# Patient Record
Sex: MALE | Race: WHITE | NOT HISPANIC OR LATINO | URBAN - METROPOLITAN AREA
[De-identification: names, ages, dates, MRNs, and addresses within clinical notes are randomized per-mention and may not be internally consistent; named-entity substitution may affect disease eponyms.]

---

## 2020-06-03 ENCOUNTER — EMERGENCY (EMERGENCY)
Facility: HOSPITAL | Age: 65
LOS: 0 days | Discharge: HOME | End: 2020-06-03
Attending: EMERGENCY MEDICINE | Admitting: EMERGENCY MEDICINE
Payer: COMMERCIAL

## 2020-06-03 VITALS
HEART RATE: 102 BPM | HEIGHT: 67 IN | TEMPERATURE: 98 F | OXYGEN SATURATION: 99 % | RESPIRATION RATE: 20 BRPM | SYSTOLIC BLOOD PRESSURE: 182 MMHG | DIASTOLIC BLOOD PRESSURE: 81 MMHG | WEIGHT: 205.03 LBS

## 2020-06-03 VITALS
TEMPERATURE: 98 F | SYSTOLIC BLOOD PRESSURE: 169 MMHG | HEART RATE: 88 BPM | OXYGEN SATURATION: 99 % | RESPIRATION RATE: 18 BRPM | DIASTOLIC BLOOD PRESSURE: 76 MMHG

## 2020-06-03 DIAGNOSIS — R06.02 SHORTNESS OF BREATH: ICD-10-CM

## 2020-06-03 DIAGNOSIS — E11.649 TYPE 2 DIABETES MELLITUS WITH HYPOGLYCEMIA WITHOUT COMA: ICD-10-CM

## 2020-06-03 DIAGNOSIS — R07.89 OTHER CHEST PAIN: ICD-10-CM

## 2020-06-03 DIAGNOSIS — R61 GENERALIZED HYPERHIDROSIS: ICD-10-CM

## 2020-06-03 DIAGNOSIS — I10 ESSENTIAL (PRIMARY) HYPERTENSION: ICD-10-CM

## 2020-06-03 DIAGNOSIS — E78.5 HYPERLIPIDEMIA, UNSPECIFIED: ICD-10-CM

## 2020-06-03 DIAGNOSIS — R25.1 TREMOR, UNSPECIFIED: ICD-10-CM

## 2020-06-03 LAB
ALBUMIN SERPL ELPH-MCNC: 4.3 G/DL — SIGNIFICANT CHANGE UP (ref 3.5–5.2)
ALP SERPL-CCNC: 26 U/L — LOW (ref 30–115)
ALT FLD-CCNC: 11 U/L — SIGNIFICANT CHANGE UP (ref 0–41)
ANION GAP SERPL CALC-SCNC: 16 MMOL/L — HIGH (ref 7–14)
AST SERPL-CCNC: 33 U/L — SIGNIFICANT CHANGE UP (ref 0–41)
BASE EXCESS BLDV CALC-SCNC: 0.9 MMOL/L — SIGNIFICANT CHANGE UP (ref -2–2)
BASOPHILS # BLD AUTO: 0.07 K/UL — SIGNIFICANT CHANGE UP (ref 0–0.2)
BASOPHILS NFR BLD AUTO: 1.1 % — HIGH (ref 0–1)
BILIRUB SERPL-MCNC: 0.2 MG/DL — SIGNIFICANT CHANGE UP (ref 0.2–1.2)
BUN SERPL-MCNC: 30 MG/DL — HIGH (ref 10–20)
CA-I SERPL-SCNC: 1.24 MMOL/L — SIGNIFICANT CHANGE UP (ref 1.12–1.3)
CALCIUM SERPL-MCNC: 9.7 MG/DL — SIGNIFICANT CHANGE UP (ref 8.5–10.1)
CHLORIDE SERPL-SCNC: 101 MMOL/L — SIGNIFICANT CHANGE UP (ref 98–110)
CO2 SERPL-SCNC: 21 MMOL/L — SIGNIFICANT CHANGE UP (ref 17–32)
CREAT SERPL-MCNC: 1.4 MG/DL — SIGNIFICANT CHANGE UP (ref 0.7–1.5)
EOSINOPHIL # BLD AUTO: 0.28 K/UL — SIGNIFICANT CHANGE UP (ref 0–0.7)
EOSINOPHIL NFR BLD AUTO: 4.5 % — SIGNIFICANT CHANGE UP (ref 0–8)
GAS PNL BLDV: 139 MMOL/L — SIGNIFICANT CHANGE UP (ref 136–145)
GAS PNL BLDV: SIGNIFICANT CHANGE UP
GLUCOSE SERPL-MCNC: 154 MG/DL — HIGH (ref 70–99)
HCO3 BLDV-SCNC: 26 MMOL/L — SIGNIFICANT CHANGE UP (ref 22–29)
HCT VFR BLD CALC: 37.3 % — LOW (ref 42–52)
HCT VFR BLDA CALC: 37.7 % — SIGNIFICANT CHANGE UP (ref 34–44)
HGB BLD CALC-MCNC: 12.3 G/DL — LOW (ref 14–18)
HGB BLD-MCNC: 12.4 G/DL — LOW (ref 14–18)
IMM GRANULOCYTES NFR BLD AUTO: 0.5 % — HIGH (ref 0.1–0.3)
LACTATE BLDV-MCNC: 2 MMOL/L — HIGH (ref 0.5–1.6)
LYMPHOCYTES # BLD AUTO: 1.96 K/UL — SIGNIFICANT CHANGE UP (ref 1.2–3.4)
LYMPHOCYTES # BLD AUTO: 31.6 % — SIGNIFICANT CHANGE UP (ref 20.5–51.1)
MCHC RBC-ENTMCNC: 28.6 PG — SIGNIFICANT CHANGE UP (ref 27–31)
MCHC RBC-ENTMCNC: 33.2 G/DL — SIGNIFICANT CHANGE UP (ref 32–37)
MCV RBC AUTO: 86.1 FL — SIGNIFICANT CHANGE UP (ref 80–94)
MONOCYTES # BLD AUTO: 0.59 K/UL — SIGNIFICANT CHANGE UP (ref 0.1–0.6)
MONOCYTES NFR BLD AUTO: 9.5 % — HIGH (ref 1.7–9.3)
NEUTROPHILS # BLD AUTO: 3.28 K/UL — SIGNIFICANT CHANGE UP (ref 1.4–6.5)
NEUTROPHILS NFR BLD AUTO: 52.8 % — SIGNIFICANT CHANGE UP (ref 42.2–75.2)
NRBC # BLD: 0 /100 WBCS — SIGNIFICANT CHANGE UP (ref 0–0)
PCO2 BLDV: 43 MMHG — SIGNIFICANT CHANGE UP (ref 41–51)
PH BLDV: 7.39 — SIGNIFICANT CHANGE UP (ref 7.26–7.43)
PLATELET # BLD AUTO: 341 K/UL — SIGNIFICANT CHANGE UP (ref 130–400)
PO2 BLDV: 50 MMHG — HIGH (ref 20–40)
POTASSIUM BLDV-SCNC: 4.1 MMOL/L — SIGNIFICANT CHANGE UP (ref 3.3–5.6)
POTASSIUM SERPL-MCNC: 5.3 MMOL/L — HIGH (ref 3.5–5)
POTASSIUM SERPL-SCNC: 5.3 MMOL/L — HIGH (ref 3.5–5)
PROT SERPL-MCNC: 7.2 G/DL — SIGNIFICANT CHANGE UP (ref 6–8)
RBC # BLD: 4.33 M/UL — LOW (ref 4.7–6.1)
RBC # FLD: 13.6 % — SIGNIFICANT CHANGE UP (ref 11.5–14.5)
SAO2 % BLDV: 84 % — SIGNIFICANT CHANGE UP
SODIUM SERPL-SCNC: 138 MMOL/L — SIGNIFICANT CHANGE UP (ref 135–146)
TROPONIN T SERPL-MCNC: <0.01 NG/ML — SIGNIFICANT CHANGE UP
TROPONIN T SERPL-MCNC: <0.01 NG/ML — SIGNIFICANT CHANGE UP
WBC # BLD: 6.21 K/UL — SIGNIFICANT CHANGE UP (ref 4.8–10.8)
WBC # FLD AUTO: 6.21 K/UL — SIGNIFICANT CHANGE UP (ref 4.8–10.8)

## 2020-06-03 PROCEDURE — 93010 ELECTROCARDIOGRAM REPORT: CPT

## 2020-06-03 PROCEDURE — 75574 CT ANGIO HRT W/3D IMAGE: CPT | Mod: 26

## 2020-06-03 PROCEDURE — 71045 X-RAY EXAM CHEST 1 VIEW: CPT | Mod: 26

## 2020-06-03 PROCEDURE — 99236 HOSP IP/OBS SAME DATE HI 85: CPT

## 2020-06-03 RX ORDER — METOPROLOL TARTRATE 50 MG
100 TABLET ORAL ONCE
Refills: 0 | Status: COMPLETED | OUTPATIENT
Start: 2020-06-03 | End: 2020-06-03

## 2020-06-03 RX ORDER — METOPROLOL TARTRATE 50 MG
50 TABLET ORAL ONCE
Refills: 0 | Status: COMPLETED | OUTPATIENT
Start: 2020-06-03 | End: 2020-06-03

## 2020-06-03 RX ADMIN — Medication 50 MILLIGRAM(S): at 07:29

## 2020-06-03 RX ADMIN — Medication 100 MILLIGRAM(S): at 08:46

## 2020-06-03 RX ADMIN — Medication 1 MILLIGRAM(S): at 13:07

## 2020-06-03 NOTE — ED ADULT NURSE REASSESSMENT NOTE - NS ED NURSE REASSESS COMMENT FT1
pt assessed, pt appears comfortable, pt resting comfortably in bed. pt received one dose of metoprolol 50mg for CT Heart w/coronaries. Pt awaiting for CT.

## 2020-06-03 NOTE — ED PROVIDER NOTE - PHYSICAL EXAMINATION
VITALS:  I have reviewed the initial vital signs.  GENERAL: Well-developed, well-nourished, in no acute distress. Nontoxic.  HEENT: Sclera clear. EOMI, PERRLA. Mucous membranes moist.  CARDIO: RRR, nl S1 and S2. No murmurs, rubs, or gallops.  PULM: Normal effort. CTA b/l without wheezes, rales, or rhonchi.  MSK: Normal, steady gait.  GI: Abdomen soft and non-distended. Nontender in all four quadrants without rebound or guarding.  : No CVA tenderness b/l.  SKIN: Warm, dry. Capillary refill <2 seconds. No pallor. No rash.  NEURO: A&Ox3. Speech clear. No gross motor/sensory deficits. 5/5 strength throughout.  PSYCH: Calm and cooperative.

## 2020-06-03 NOTE — ED PROVIDER NOTE - CLINICAL SUMMARY MEDICAL DECISION MAKING FREE TEXT BOX
65 year old male w hx of NIDDM2 (currently rx'd 5 mg QD Glipizide, but takes 2.5 mg now, and 1000 mg BID Metformin), HTN, HLD presents to the ED for evaluation of intermittent episodes of generalized weakness, shaking, and diaphoresis, on/off dizziness, intermittent mid sternal cp, associated with hypoglycemia x 5 days. Pt admits to similar episode a couple months ago, started decreasing his Glipizide to 2.5 mg BID. CP and dizziness going on for weeks now. Episodes most often occur at night after meals around 10 pm. Tonight patient had dinner, 1 hour later he felt symptomatic and his blood sugar was "too low to read" on his glucometer (has the dexcom melba with implantable glucose meter in abdomen). Pt drank OJ and later had a reading of 107. Pt otherwise denies fevers/chills, cough, shortness of breath, abd pain, nausea, vomiting, diarrhea, back/flank pain,  complaints, rash, change in diet/exercise/stress/sleep. Does work nights 2 x a week. Was not at work tonight. On average, for past 3 months, melba says blood glucose is 147. The chest Pain is not pleuritic, positional, or worse with exertion. Last cardiac workup - nuc stress 2015, normal. Exam is normal  (agree with PA note). Will check labs, ekg, xray and place in OBS. Likely the low blood glucose is medication related, and will hold meds tonight. But given other complaints will need ACS workup while here. Place in OBS if first trop neg. Upon dc from OBS will need Endocrinology referral

## 2020-06-03 NOTE — ED CDU PROVIDER INITIAL DAY NOTE - OBJECTIVE STATEMENT
66y/o male with pmh of htn, hld, dm, pt. c/o an episode of hypoglycemia at home where his fs was 60. pt. states his fs fluctuates between 130 and 60. pt. states when fs was 60 he felt shaky and diaphoretic. no new meds. currently on metformin and glipizide. pt. also c/o diffuse cp x 1 month associated with occasional sob. no cardiologist, ex smoker. + family hx of cad in mother.

## 2020-06-03 NOTE — ED PROVIDER NOTE - OBJECTIVE STATEMENT
65 year old male w hx of NIDDM2 (currently rx'd 5 mg QD Glipizide and 1000 mg BID Metformin), HTN, HLD presents to the ED for evaluation of intermittent episodes of generalized weakness, shaking, and diaphoresis associated with hypoglycemia x 5 days. Pt admits to similar episode a couple months ago, started decreasing his Glipizide to 2.5 mg BID. His symptoms went away for a few weeks but returned again this week. Episodes most often occur at night after meals. Tonight patient had dinner, 1 hour later he felt symptomatic and his blood sugar was "too low to read" on his glucometer. Pt drank OJ and later had a reading of 107. Pt otherwise denies fevers/chills, cough, shortness of breath, chest pain, abd pain, nausea, vomiting, diarrhea, back/flank pain, irritative voiding symptoms, rash, change in diet/exercise. 65 year old male w hx of NIDDM2 (currently rx'd 5 mg QD Glipizide and 1000 mg BID Metformin), HTN, HLD presents to the ED for evaluation of intermittent episodes of generalized weakness, shaking, and diaphoresis associated with hypoglycemia x 5 days. Pt admits to similar episode a couple months ago, started decreasing his Glipizide to 2.5 mg BID. His symptoms went away for a few weeks but returned again this week. Episodes most often occur at night after meals. Tonight patient had dinner, 1 hour later he felt symptomatic and his blood sugar was "too low to read" on his glucometer. Pt drank OJ and later had a reading of 107. Pt otherwise denies fevers/chills, cough, shortness of breath, abd pain, nausea, vomiting, diarrhea, back/flank pain, irritative voiding symptoms, rash, change in diet/exercise.    Pt also endorsing 2 weeks of mid-sternal, non-radiating chest pain. Pain is not pleuritic, positional, or worse with exertion. Last cardiac workup - nuc stress 2015, normal.

## 2020-06-03 NOTE — ED ADULT NURSE NOTE - OBJECTIVE STATEMENT
pt aox4 complaining of hypoglycemia/ f/s within normal limits. pt denies chills/ weakness/diaphoresis. pt complaining of mid sternal chest pain- non radiating. pt placed on cardiac monitor; labs sent. iv in place 18 g to right forearm. will continue to monitor / assess

## 2020-06-03 NOTE — ED CDU PROVIDER DISPOSITION NOTE - CLINICAL COURSE
Pt with chest pain, neg cardiac w/u in obs, pt also had hypoglycemia at home but resolved in ED.  will dc glipizide and pt to f/u with pmd and cardio outpt

## 2020-06-03 NOTE — ED CDU PROVIDER INITIAL DAY NOTE - ATTENDING CONTRIBUTION TO CARE
66 yo M with PMH of HTN, HLD, DM2 on glipizide and metformin presented to the ED for recurrent hypoglycemia and CP.   Patient has had issues with hypoglycemia for months. He does not have an endocrinologist but gets his medication from his NP. Patient decreased his Glipizide dose to improve his hypoglycemic symptoms with some success. However, he states he often has episodes of hypoglycemia with glucose readings in the 50s.   Patient's CP is intermittent. No dyspnea on exertion, no SOB, no palpitations. He has never seen a cardiologist.     Const: Well nourished, well developed, appears stated age  Eyes: PERRL, no conjunctival injection  HENT:  Neck supple without meningismus   CV: RRR, Warm, well-perfused extremities  RESP: CTA B/L, no tachypnea   GI: soft, non-tender, non-distended  MSK: No gross deformities appreciated  Skin: Warm, dry. No rashes  Neuro: Alert, CNs II-XII grossly intact. Sensation and motor function of extremities grossly intact.  Psych: Appropriate mood and affect.

## 2020-06-03 NOTE — ED CDU PROVIDER DISPOSITION NOTE - PATIENT PORTAL LINK FT
You can access the FollowMyHealth Patient Portal offered by [facility] by registering at the following website: [email or web address]. By joining [facility]’s FollowMyHealth portal, you will also be able to view your health information using other applications (apps) compatible with our system.

## 2020-06-03 NOTE — ED CDU PROVIDER INITIAL DAY NOTE - MEDICAL DECISION MAKING DETAILS
66 yo M placed in observation for hypoglycemia and CP. Patient had negative troponin x2. Scheduled for CCTA. Will discuss case with endocrine for DM medication recommendations.

## 2020-06-03 NOTE — ED PROVIDER NOTE - NS ED ROS FT
yes
yes
CONSTITUTIONAL: (-) fevers, (-) chills  ENT: (-) congestion, (-) rhinorrhea, (-) sore throat  CARDIO: (-) chest pain, (-) palpitations, (-) edema  PULM: (-) cough, (-) sputum, (-) shortness of breath  GI: (-) nausea, (-) vomiting, (-) diarrhea, (-) abdominal pain  : (-) dysuria, (-) hematuria, (-) frequency, (-) urgency, (-) flank pain  ENDO: see HPI, (+) polyuria, (-) polydipsia, (-) polyphagia  MSK: (-) back pain, (-) myalgias  SKIN: (-) rashes, (-) wounds  NEURO: (-) headache, (-) dizziness, (-) lightheadedness,  (-) weakness, (-) paresthesias, (-) numbness, (-) syncope, (-) speech changes, (-) facial droop, (-) confusion, (-) seizures    *all other systems negative except as documented above and in the HPI*

## 2020-06-03 NOTE — ED CDU PROVIDER INITIAL DAY NOTE - PROGRESS NOTE DETAILS
pt seen bedsie, NAD, no complaints at the moment scheduled for CCTA. pt states he initially cme in because of his low glucose readings and has not been able to see his primary to adjust his meds. spoke with endocrinologist Dr. Bowman will adjust patient meds adivses to stop glipizide 5mg completely and continue taking metformin 1000 bid. also provided office number for patient to follow up out patient and leave appointment [phone].

## 2020-06-03 NOTE — ED CDU PROVIDER DISPOSITION NOTE - CARE PROVIDER_API CALL
Bib Edwards Y  CARDIOVASCULAR DISEASE  [address] [place] [id number]  Phone: [phone]  Fax: [phone]  Follow Up Time:

## 2020-06-10 PROBLEM — Z00.00 ENCOUNTER FOR PREVENTIVE HEALTH EXAMINATION: Status: ACTIVE | Noted: 2020-06-10

## 2020-06-10 PROBLEM — E11.9 TYPE 2 DIABETES MELLITUS WITHOUT COMPLICATIONS: Chronic | Status: ACTIVE | Noted: 2020-06-03

## 2020-06-10 PROBLEM — E78.5 HYPERLIPIDEMIA, UNSPECIFIED: Chronic | Status: ACTIVE | Noted: 2020-06-03

## 2020-06-10 PROBLEM — I10 ESSENTIAL (PRIMARY) HYPERTENSION: Chronic | Status: ACTIVE | Noted: 2020-06-03

## 2020-06-12 ENCOUNTER — RESULT CHARGE (OUTPATIENT)
Age: 65
End: 2020-06-12

## 2020-06-12 ENCOUNTER — APPOINTMENT (OUTPATIENT)
Dept: CARDIOLOGY | Facility: CLINIC | Age: 65
End: 2020-06-12
Payer: COMMERCIAL

## 2020-06-12 VITALS — BODY MASS INDEX: 32.13 KG/M2 | HEIGHT: 68 IN | WEIGHT: 212 LBS

## 2020-06-12 DIAGNOSIS — Z86.79 PERSONAL HISTORY OF OTHER DISEASES OF THE CIRCULATORY SYSTEM: ICD-10-CM

## 2020-06-12 DIAGNOSIS — Z83.3 FAMILY HISTORY OF DIABETES MELLITUS: ICD-10-CM

## 2020-06-12 DIAGNOSIS — Z86.39 PERSONAL HISTORY OF OTHER ENDOCRINE, NUTRITIONAL AND METABOLIC DISEASE: ICD-10-CM

## 2020-06-12 DIAGNOSIS — Z87.891 PERSONAL HISTORY OF NICOTINE DEPENDENCE: ICD-10-CM

## 2020-06-12 PROCEDURE — 93000 ELECTROCARDIOGRAM COMPLETE: CPT

## 2020-06-12 PROCEDURE — 99204 OFFICE O/P NEW MOD 45 MIN: CPT

## 2020-06-12 NOTE — HISTORY OF PRESENT ILLNESS
[FreeTextEntry1] : 66 y/o male with history of HTN, DM, DL, recently was evaluated in the [facility] ER for chest discomfort. CCTA was abnormal with LAD lesion, which was not well evaluated due to motion artifact. The patient also had a non-obstructive lesion in the LCX. RCA was small (non-dominant).  He reports he still gets episodes of chest discomfort, described as tightness, not always exertional. Also with episodes of palpitation, less frequent than 24 hours.

## 2020-06-12 NOTE — ASSESSMENT
[FreeTextEntry1] : CAD by CT scan\par No surgical disease\par LAD lesion - unclear significance.\par DM, DL, HTN\par \par C/w medical therapy\par Obtain nuclear stress test to assess for ischemia in the LAD territory.\par Add ASA\par \par Palpitations - less frequent than 24 hours - obtain event monitor.\par \par Labs with PMD - including HgA1c and Lipid panel.\par \par F/u in 1 months.\par

## 2020-06-12 NOTE — PHYSICAL EXAM
[Normal Appearance] : normal appearance [Well Groomed] : well groomed [No Deformities] : no deformities [General Appearance - In No Acute Distress] : no acute distress [Normal Conjunctiva] : the conjunctiva exhibited no abnormalities [Eyelids - No Xanthelasma] : the eyelids demonstrated no xanthelasmas [FreeTextEntry1] : no JVD, no bruits [] : no respiratory distress [Respiration, Rhythm And Depth] : normal respiratory rhythm and effort [Exaggerated Use Of Accessory Muscles For Inspiration] : no accessory muscle use [Auscultation Breath Sounds / Voice Sounds] : lungs were clear to auscultation bilaterally [Heart Rate And Rhythm] : heart rate and rhythm were normal [Heart Sounds] : normal S1 and S2 [Murmurs] : no murmurs present [Edema] : no peripheral edema present [Bowel Sounds] : normal bowel sounds [Abdomen Soft] : soft [Abdomen Tenderness] : non-tender [Abnormal Walk] : normal gait [Nail Clubbing] : no clubbing of the fingernails [Cyanosis, Localized] : no localized cyanosis [Skin Color & Pigmentation] : normal skin color and pigmentation [No Venous Stasis] : no venous stasis [Oriented To Time, Place, And Person] : oriented to person, place, and time [Affect] : the affect was normal

## 2020-06-25 ENCOUNTER — OUTPATIENT (OUTPATIENT)
Dept: OUTPATIENT SERVICES | Facility: HOSPITAL | Age: 65
LOS: 1 days | Discharge: HOME | End: 2020-06-25
Payer: COMMERCIAL

## 2020-06-25 ENCOUNTER — RESULT REVIEW (OUTPATIENT)
Age: 65
End: 2020-06-25

## 2020-06-25 DIAGNOSIS — I25.84 CORONARY ATHEROSCLEROSIS DUE TO CALCIFIED CORONARY LESION: ICD-10-CM

## 2020-06-25 PROCEDURE — 78452 HT MUSCLE IMAGE SPECT MULT: CPT | Mod: 26

## 2020-08-10 ENCOUNTER — APPOINTMENT (OUTPATIENT)
Dept: CARDIOLOGY | Facility: CLINIC | Age: 65
End: 2020-08-10
Payer: COMMERCIAL

## 2020-08-10 PROCEDURE — 99213 OFFICE O/P EST LOW 20 MIN: CPT | Mod: 95

## 2020-08-10 NOTE — HISTORY OF PRESENT ILLNESS
[FreeTextEntry1] : 64 y/o male with history of HTN, DM, DL, recently was evaluated in the [facility] ER for chest discomfort. CCTA was abnormal with LAD lesion, which was not well evaluated due to motion artifact. The patient also had a non-obstructive lesion in the LCX. RCA was small (non-dominant).  No further chest pain. No palpitations.\par Holter/MCOT - negative for sustained arrhythmia\par Nuclear MPI - small inferior defect.

## 2020-08-10 NOTE — ASSESSMENT
[FreeTextEntry1] : CAD by CT scan\par No surgical disease\par LAD lesion - negative nuclear MPI in anterior wall. No LAD ischemia. Mild lesion in LCX - small inferior wall defect.\par DM, DL, HTN\par \par In the absence of significant symptoms, would c/w medical therapy\par C/w ASA, statin. DM, Lipid control.\par \par Negative event monitor. C/w BP control.\par \par Labs with PMD - including HgA1c and Lipid panel.\par \par F/u in 6 months.\par

## 2020-08-10 NOTE — PHYSICAL EXAM
[Normal Appearance] : normal appearance [Well Groomed] : well groomed [No Deformities] : no deformities [General Appearance - In No Acute Distress] : no acute distress [Normal Conjunctiva] : the conjunctiva exhibited no abnormalities [] : no respiratory distress [FreeTextEntry1] : NC/AT [Eyelids - No Xanthelasma] : the eyelids demonstrated no xanthelasmas [Affect] : the affect was normal [Oriented To Time, Place, And Person] : oriented to person, place, and time

## 2020-09-13 NOTE — ED CDU PROVIDER DISPOSITION NOTE - NSFOLLOWUPINSTRUCTIONS_ED_ALL_ED_FT
follow up PMD / CARDIOLOGY      DISCONTINUE GLIPIZIDE  FOLLOW UP endocrinology Dr. [phone]  for Diabetes       Chest Pain    Chest pain can be caused by many different conditions which may or may not be dangerous. Causes include heartburn, lung infections, heart attack, blood clot in lungs, skin infections, strain or damage to muscle, cartilage, or bones, etc. In addition to a history and physical examination, an electrocardiogram (ECG) or other lab tests may have been performed to determine the cause of your chest pain. Follow up with your primary care provider or with a cardiologist as instructed.     SEEK IMMEDIATE MEDICAL CARE IF YOU HAVE ANY OF THE FOLLOWING SYMPTOMS: worsening chest pain, coughing up blood, unexplained back/neck/jaw pain, severe abdominal pain, dizziness or lightheadedness, fainting, shortness of breath, sweaty or clammy skin, vomiting, or racing heart beat. These symptoms may represent a serious problem that is an emergency. Do not wait to see if the symptoms will go away. Get medical help right away. Call 911 and do not drive yourself to the hospital. Pending

## 2020-11-11 ENCOUNTER — TRANSCRIPTION ENCOUNTER (OUTPATIENT)
Age: 65
End: 2020-11-11

## 2020-11-24 ENCOUNTER — APPOINTMENT (OUTPATIENT)
Dept: CARDIOLOGY | Facility: CLINIC | Age: 65
End: 2020-11-24
Payer: COMMERCIAL

## 2020-11-24 VITALS
BODY MASS INDEX: 30.92 KG/M2 | SYSTOLIC BLOOD PRESSURE: 160 MMHG | WEIGHT: 204 LBS | HEIGHT: 68 IN | HEART RATE: 64 BPM | TEMPERATURE: 97.2 F | DIASTOLIC BLOOD PRESSURE: 79 MMHG

## 2020-11-24 PROCEDURE — 93000 ELECTROCARDIOGRAM COMPLETE: CPT

## 2020-11-24 PROCEDURE — 99213 OFFICE O/P EST LOW 20 MIN: CPT

## 2020-11-24 NOTE — PHYSICAL EXAM
[Normal Appearance] : normal appearance [Well Groomed] : well groomed [No Deformities] : no deformities [General Appearance - In No Acute Distress] : no acute distress [Normal Conjunctiva] : the conjunctiva exhibited no abnormalities [Eyelids - No Xanthelasma] : the eyelids demonstrated no xanthelasmas [FreeTextEntry1] : no JVD [] : no respiratory distress [Respiration, Rhythm And Depth] : normal respiratory rhythm and effort [Exaggerated Use Of Accessory Muscles For Inspiration] : no accessory muscle use [Auscultation Breath Sounds / Voice Sounds] : lungs were clear to auscultation bilaterally [Heart Rate And Rhythm] : heart rate and rhythm were normal [Heart Sounds] : normal S1 and S2 [Murmurs] : no murmurs present [Edema] : no peripheral edema present [Bowel Sounds] : normal bowel sounds [Abdomen Soft] : soft [Abdomen Tenderness] : non-tender [Abnormal Walk] : normal gait [Nail Clubbing] : no clubbing of the fingernails [Cyanosis, Localized] : no localized cyanosis [Skin Color & Pigmentation] : normal skin color and pigmentation [Skin Turgor] : normal skin turgor [Oriented To Time, Place, And Person] : oriented to person, place, and time [Affect] : the affect was normal

## 2020-11-24 NOTE — HISTORY OF PRESENT ILLNESS
[FreeTextEntry1] : 64 y/o male with history of HTN, DM, DL, recently was evaluated in the [facility] ER for chest discomfort. CCTA was abnormal with LAD lesion, which was not well evaluated due to motion artifact. The patient also had a non-obstructive lesion in the LCX. RCA was small (non-dominant).  No further chest pain. No palpitations. Clinically stable.\par Holter/MCOT - negative for sustained arrhythmia\par Nuclear MPI - small inferior defect.

## 2021-03-21 ENCOUNTER — TRANSCRIPTION ENCOUNTER (OUTPATIENT)
Age: 66
End: 2021-03-21

## 2021-10-20 NOTE — ED CDU PROVIDER DISPOSITION NOTE - NS ED ATTENDING STATEMENT MOD
Patient discussed on morning rounds with Dr. Jauregui    Operation / Date: 10/19/21 Watchman    SUBJECTIVE ASSESSMENT:  65y Male seen and examined at bedside.  Denies chest pain, shortness of breath, nausea, vomiting.        Vital Signs Last 24 Hrs  T(C): --  T(F): --  HR: 59 (19 Oct 2021 14:15) (58 - 59)  BP: --  BP(mean): --  RR: 16 (19 Oct 2021 14:15) (16 - 18)  SpO2: 100% (19 Oct 2021 14:15) (100% - 100%)  I&O's Detail        PHYSICAL EXAM:    GEN: NAD, looks comfortable  Psych: Mood appropriate  Neuro: A&Ox3.  No focal deficits.  Moving all extremities.   HEENT: No obvious abnormalities  CV: S1S2, irregular, no murmurs appreciated.  No carotid bruits.  No JVD  Lungs: Clear B/L.  No wheezing, rales or rhonchi  ABD: Soft, non-tender, non-distended.  +Bowel sounds  EXT: Warm and well perfused.  No peripheral edema noted  Musculoskeletal: Moving all extremities with normal ROM, no joint swelling  PV: Pedal pulses palpable  Incision: Right groin soft with suture in place, no hematoma  LABS:                        9.6    7.25  )-----------( 148      ( 19 Oct 2021 14:03 )             31.8       COUMADIN:  No. REASON: .    PT/INR - ( 19 Oct 2021 09:05 )   PT: 12.2 sec;   INR: 1.02          PTT - ( 19 Oct 2021 09:05 )  PTT:30.8 sec    10-19    140  |  96  |  32<H>  ----------------------------<  78  4.0   |  32<H>  |  6.05<H>    Ca    9.0      19 Oct 2021 09:05    TPro  7.7  /  Alb  4.1  /  TBili  0.3  /  DBili  x   /  AST  14  /  ALT  8<L>  /  AlkPhos  127<H>  10      Urinalysis Basic - ( 19 Oct 2021 10:43 )    Color: Yellow / Appearance: Clear / S.025 / pH: x  Gluc: x / Ketone: NEGATIVE  / Bili: Negative / Urobili: 0.2 E.U./dL   Blood: x / Protein: >=300 mg/dL / Nitrite: NEGATIVE   Leuk Esterase: Moderate / RBC: < 5 /HPF / WBC Many /HPF   Sq Epi: x / Non Sq Epi: 5-10 /HPF / Bacteria: Many /HPF        MEDICATIONS  (STANDING):  allopurinol 200 milliGRAM(s) Oral two times a day  aMIOdarone    Tablet 200 milliGRAM(s) Oral daily  aspirin enteric coated 81 milliGRAM(s) Oral daily  ceFAZolin   IVPB 2000 milliGRAM(s) IV Intermittent every 8 hours  chlorhexidine 0.12% Liquid 5 milliLiter(s) Oral Mucosa two times a day  dextrose 40% Gel 15 Gram(s) Oral once  dextrose 5%. 1000 milliLiter(s) (50 mL/Hr) IV Continuous <Continuous>  dextrose 5%. 1000 milliLiter(s) (100 mL/Hr) IV Continuous <Continuous>  dextrose 50% Injectable 25 Gram(s) IV Push once  dextrose 50% Injectable 12.5 Gram(s) IV Push once  dextrose 50% Injectable 25 Gram(s) IV Push once  glucagon  Injectable 1 milliGRAM(s) IntraMuscular once  heparin   Injectable 5000 Unit(s) SubCutaneous every 8 hours  insulin lispro (ADMELOG) corrective regimen sliding scale   SubCutaneous Before meals and at bedtime  meperidine     Injectable 25 milliGRAM(s) IV Push once  sevelamer carbonate 800 milliGRAM(s) Oral three times a day with meals  sodium chloride 0.9% lock flush 3 milliLiter(s) IV Push every 8 hours  sodium chloride 0.9%. 1000 milliLiter(s) (10 mL/Hr) IV Continuous <Continuous>    MEDICATIONS  (PRN):        RADIOLOGY & ADDITIONAL TESTS:     Patient discussed on morning rounds with Dr. Cui    Operation / Date: Watchman Placement    SUBJECTIVE ASSESSMENT:  This 65 year old male with PMH significant for HTN, ESRD (has LUE AVF and on HD MWF), T-cell lymphoma (diagnosed about 19 years ago and now in remission), a-fib (on ASA/plavix, not compatible with Eliquis due to bleedings), HFpEF (followed by CHF team), mitral regurgitation, severe pHTN and history of LVATS robotic assisted drainage of pleural effusion, decortication and pleurodesis and chest wall hematoma evacuation is brought to this admission today for Watchman device placement in order to prevent him from blood clots and subsequently decrease his requirement of anticoagulation.  On 10/19/21 patient underwent watchman device placement.  Patient brought to [facility] for recovery, POD1 patient arterial line removed, echocardiogram completed. Patient underwent dialysis prior to discharge home.     Patient was seen and examined this morning, care was discussed with Dr. Cui and deemed medically appropriate for discharge with outpatient follow up. Patient was hemodynamically stable and afebrile overnight and feels comfortable being discharged home this AM.     Over 40 minutes was spent with the patient reviewing the discharge material including medications, follow up appointments, recovery, concerning symptoms, and how to contact their health care providers if they have questions        Vital Signs Last 24 Hrs  T(C): 36.1 (20 Oct 2021 04:18), Max: 36.4 (19 Oct 2021 17:25)  T(F): 96.9 (20 Oct 2021 04:18), Max: 97.6 (19 Oct 2021 21:50)  HR: 68 (20 Oct 2021 08:00) (58 - 73)  BP: --  BP(mean): --  RR: 18 (20 Oct 2021 07:00) (16 - 21)  SpO2: 100% (20 Oct 2021 08:00) (93% - 100%)  I&O's Detail    19 Oct 2021 07:01  -  20 Oct 2021 07:00  --------------------------------------------------------  IN:    IV PiggyBack: 250 mL    Oral Fluid: 925 mL  Total IN: 1175 mL    OUT:    Voided (mL): 125 mL  Total OUT: 125 mL    Total NET: 1050 mL      CHEST TUBE:  No  ROMARIO DRAIN:  No.  EPICARDIAL WIRES: No.  TIE DOWNS: No.  BACK: No.    PHYSICAL EXAM:  Neuro: A+O x 3, non-focal, speech clear and intact  HEENT: PERRL, EOMI, oral mucosa pink and moist  Neck: supple, no JVD  CV: regular rate, regular rhythm, +S1S2, no murmurs or rub  Pulm/chest: lung sounds CTA and equal bilaterally, no accessory muscle use noted  Abd: soft, NT, ND, +BS  Ext: MATIAS x 4, no C/C/E  Skin: warm, well perfused, no rashes   Incision Sites: Bilateral groins soft to palpation    LABS:                        9.7    4.89  )-----------( 133      ( 20 Oct 2021 01:57 )             31.8       PT/INR - ( 20 Oct 2021 01:57 )   PT: 12.3 sec;   INR: 1.03          PTT - ( 20 Oct 2021 01:57 )  PTT:42.6 sec    10-20    142  |  100  |  45<H>  ----------------------------<  104<H>  4.0   |  26  |  6.98<H>    Ca    8.9      20 Oct 2021 01:57  Phos  6.8     10-20  Mg     2.2     10-20    TPro  6.5  /  Alb  3.3  /  TBili  0.2  /  DBili  x   /  AST  18  /  ALT  7<L>  /  AlkPhos  156<H>  10-20      Urinalysis Basic - ( 19 Oct 2021 10:43 )    Color: Yellow / Appearance: Clear / S.025 / pH: x  Gluc: x / Ketone: NEGATIVE  / Bili: Negative / Urobili: 0.2 E.U./dL   Blood: x / Protein: >=300 mg/dL / Nitrite: NEGATIVE   Leuk Esterase: Moderate / RBC: < 5 /HPF / WBC Many /HPF   Sq Epi: x / Non Sq Epi: 5-10 /HPF / Bacteria: Many /HPF        MEDICATIONS  (STANDING):  allopurinol 200 milliGRAM(s) Oral two times a day  aMIOdarone    Tablet 200 milliGRAM(s) Oral daily  aspirin enteric coated 81 milliGRAM(s) Oral daily  ceFAZolin   IVPB 2000 milliGRAM(s) IV Intermittent every 8 hours  clopidogrel Tablet 75 milliGRAM(s) Oral daily  dextrose 40% Gel 15 Gram(s) Oral once  dextrose 5%. 1000 milliLiter(s) (50 mL/Hr) IV Continuous <Continuous>  dextrose 5%. 1000 milliLiter(s) (100 mL/Hr) IV Continuous <Continuous>  dextrose 50% Injectable 25 Gram(s) IV Push once  dextrose 50% Injectable 12.5 Gram(s) IV Push once  dextrose 50% Injectable 25 Gram(s) IV Push once  glucagon  Injectable 1 milliGRAM(s) IntraMuscular once  heparin   Injectable 5000 Unit(s) SubCutaneous every 8 hours  insulin lispro (ADMELOG) corrective regimen sliding scale   SubCutaneous Before meals and at bedtime  pantoprazole    Tablet 40 milliGRAM(s) Oral before breakfast  sevelamer carbonate 800 milliGRAM(s) Oral three times a day with meals  sodium chloride 0.9% lock flush 3 milliLiter(s) IV Push every 8 hours  sodium chloride 0.9%. 1000 milliLiter(s) (10 mL/Hr) IV Continuous <Continuous>    MEDICATIONS  (PRN):  acetaminophen   Tablet .. 650 milliGRAM(s) Oral every 6 hours PRN Mild Pain (1 - 3)       Patient is a 65y Male seen and evaluated at bedside during dialysis. NAD, comfortable, pending DC.    Meds:    acetaminophen   Tablet .. 650 every 6 hours PRN  allopurinol 200 two times a day  aMIOdarone    Tablet 200 daily  aspirin enteric coated 81 daily  clopidogrel Tablet 75 daily  dextrose 40% Gel 15 once  dextrose 5%. 1000 <Continuous>  dextrose 5%. 1000 <Continuous>  dextrose 50% Injectable 25 once  dextrose 50% Injectable 12.5 once  dextrose 50% Injectable 25 once  glucagon  Injectable 1 once  heparin   Injectable 5000 every 8 hours  insulin lispro (ADMELOG) corrective regimen sliding scale  Before meals and at bedtime  pantoprazole    Tablet 40 before breakfast  sevelamer carbonate 800 three times a day with meals  sodium chloride 0.9% lock flush 3 every 8 hours  sodium chloride 0.9%. 1000 <Continuous>      T(C): , Max: 36.7 (10-20-21 @ 11:15)  T(F): , Max: 98 (10-20-21 @ 11:15)  HR: 69 (10-20-21 @ 14:15)  BP: 127/65 (10-20-21 @ 14:15)  BP(mean): 101 (10-20-21 @ 09:00)  RR: 18 (10-20-21 @ 14:15)  SpO2: 99% (10-20-21 @ 14:15)  Wt(kg): --    10-19 @ 07:01  -  10-20 @ 07:00  --------------------------------------------------------  IN: 1175 mL / OUT: 125 mL / NET: 1050 mL    10-20 @ 07:01  -  10-20 @ 17:08  --------------------------------------------------------  IN: 500 mL / OUT: 2500 mL / NET: -2000 mL        Review of Systems:  RESPIRATORY: No shortness of breath  CARDIOVASCULAR: Mild chest pain   MUSCULOSKELETAL: No leg oedema    PHYSICAL EXAM:  GENERAL: Alert, awake, oriented x3 on NC 3L  CHEST/LUNG: Bilateral clear breath sounds, no rales  HEART: Regular rate and rhythm, no murmur  ABDOMEN: Soft, nontender, non distended  EXTREMITIES: no pedal oedema  ACCESS: LUE AVF w/bruit and thrill     LABS:                        9.7    4.89  )-----------( 133      ( 20 Oct 2021 01:57 )             31.8     10-20    142  |  100  |  45<H>  ----------------------------<  104<H>  4.0   |  26  |  6.98<H>    Ca    8.9      20 Oct 2021 01:57  Phos  6.8     10-20  Mg     2.2     10-20    TPro  6.5  /  Alb  3.3  /  TBili  0.2  /  DBili  x   /  AST  18  /  ALT  7<L>  /  AlkPhos  156<H>  10-20    Hepatitis B Surface Antibody: Nonreact (10-20 @ 11:50)  Hepatitis C Virus S/CO Ratio: 0.05 S/CO (10-20 @ 11:50)    PT/INR - ( 20 Oct 2021 01:57 )   PT: 12.3 sec;   INR: 1.03          PTT - ( 20 Oct 2021 01:57 )  PTT:42.6 sec  Urinalysis Basic - ( 19 Oct 2021 10:43 )    Color: Yellow / Appearance: Clear / S.025 / pH: x  Gluc: x / Ketone: NEGATIVE  / Bili: Negative / Urobili: 0.2 E.U./dL   Blood: x / Protein: >=300 mg/dL / Nitrite: NEGATIVE   Leuk Esterase: Moderate / RBC: < 5 /HPF / WBC Many /HPF   Sq Epi: x / Non Sq Epi: 5-10 /HPF / Bacteria: Many /HPF            RADIOLOGY & ADDITIONAL STUDIES:           I have personally performed a face to face diagnostic evaluation on this patient. I have reviewed the ACP note and agree with the history, exam and plan of care, except as noted.

## 2021-12-13 ENCOUNTER — APPOINTMENT (OUTPATIENT)
Dept: CARDIOLOGY | Facility: CLINIC | Age: 66
End: 2021-12-13
Payer: COMMERCIAL

## 2021-12-13 VITALS
WEIGHT: 210 LBS | SYSTOLIC BLOOD PRESSURE: 150 MMHG | TEMPERATURE: 97.5 F | BODY MASS INDEX: 31.83 KG/M2 | HEIGHT: 68 IN | HEART RATE: 75 BPM | DIASTOLIC BLOOD PRESSURE: 70 MMHG

## 2021-12-13 PROCEDURE — 99214 OFFICE O/P EST MOD 30 MIN: CPT

## 2021-12-13 PROCEDURE — 93000 ELECTROCARDIOGRAM COMPLETE: CPT

## 2021-12-13 RX ORDER — SEMAGLUTIDE 1.34 MG/ML
2 INJECTION, SOLUTION SUBCUTANEOUS
Refills: 0 | Status: COMPLETED | COMMUNITY
End: 2021-12-13

## 2021-12-13 NOTE — HISTORY OF PRESENT ILLNESS
[FreeTextEntry1] : 67 y/o male with history of HTN, DM, DL,  Post  [facility] ER  visit  on 11/2020 for  chest discomfort. CCTA was abnormal with LAD lesion, which was not well evaluated due to motion artifact. The patient also had a non-obstructive lesion in the LCX. RCA was small (non-dominant).  No further chest pain. No palpitations. Clinically stable.\par Holter/MCOT - negative for sustained arrhythmia\par Nuclear MPI - small inferior defect.\par Pt  doesn't exercise, has stairs in his house and has no SOB no chest pain, no edema.  Hx of palpitations-- resolved.   BSL 120mgdl - 141mg/dl.  Teaching is provided r.t importance to comply with low cholesterol, low sodium, diabetic diet.

## 2021-12-13 NOTE — PHYSICAL EXAM
[Normal Appearance] : normal appearance [Well Groomed] : well groomed [No Deformities] : no deformities [General Appearance - In No Acute Distress] : no acute distress [Normal Conjunctiva] : the conjunctiva exhibited no abnormalities [Eyelids - No Xanthelasma] : the eyelids demonstrated no xanthelasmas [] : no respiratory distress [Respiration, Rhythm And Depth] : normal respiratory rhythm and effort [Exaggerated Use Of Accessory Muscles For Inspiration] : no accessory muscle use [Auscultation Breath Sounds / Voice Sounds] : lungs were clear to auscultation bilaterally [Heart Rate And Rhythm] : heart rate and rhythm were normal [Heart Sounds] : normal S1 and S2 [Murmurs] : no murmurs present [Edema] : no peripheral edema present [Bowel Sounds] : normal bowel sounds [Abdomen Tenderness] : non-tender [Abdomen Soft] : soft [Abnormal Walk] : normal gait [Nail Clubbing] : no clubbing of the fingernails [Cyanosis, Localized] : no localized cyanosis [Skin Color & Pigmentation] : normal skin color and pigmentation [Skin Turgor] : normal skin turgor [Oriented To Time, Place, And Person] : oriented to person, place, and time [Affect] : the affect was normal [FreeTextEntry1] : NC/AT

## 2021-12-13 NOTE — ASSESSMENT
[FreeTextEntry1] : CAD by CT scan\par No surgical disease\par LAD lesion - negative nuclear MPI in anterior wall. No LAD ischemia. Mild lesion in LCX - small inferior wall defect.\par DM, DL, HTN\par C/w nonobstructive CAD\par \par In the absence of significant symptoms, would c/w medical therapy\par C/w ASA, statin. DM, Lipid control.\par BP elevated - increase Coreg to 6.25 mg q12\par C/w Nifediline\par \par Negative event monitor. C/w BP control.\par \par Labs with PMD - including HgA1c and Lipid panel.\par \par Patient was advised about healthy lifestyle changes, including diet and exercise. Importance of sustained long-term weight loss was discussed, questions answered.\par \par \par F/u in 12 months.\par

## 2022-06-01 ENCOUNTER — RX RENEWAL (OUTPATIENT)
Age: 67
End: 2022-06-01

## 2022-09-06 ENCOUNTER — APPOINTMENT (OUTPATIENT)
Dept: CARDIOLOGY | Facility: CLINIC | Age: 67
End: 2022-09-06

## 2022-09-06 VITALS
DIASTOLIC BLOOD PRESSURE: 79 MMHG | HEART RATE: 71 BPM | WEIGHT: 208 LBS | SYSTOLIC BLOOD PRESSURE: 130 MMHG | BODY MASS INDEX: 31.52 KG/M2 | HEIGHT: 68 IN | TEMPERATURE: 97.7 F

## 2022-09-06 PROCEDURE — 93000 ELECTROCARDIOGRAM COMPLETE: CPT

## 2022-09-06 PROCEDURE — 99214 OFFICE O/P EST MOD 30 MIN: CPT

## 2022-09-06 RX ORDER — CARVEDILOL 6.25 MG/1
6.25 TABLET, FILM COATED ORAL TWICE DAILY
Qty: 180 | Refills: 1 | Status: COMPLETED | COMMUNITY
Start: 2021-12-13 | End: 2022-09-06

## 2022-09-06 NOTE — PHYSICAL EXAM
[Normal Appearance] : normal appearance [Well Groomed] : well groomed [No Deformities] : no deformities [General Appearance - In No Acute Distress] : no acute distress [Normal Conjunctiva] : the conjunctiva exhibited no abnormalities [Eyelids - No Xanthelasma] : the eyelids demonstrated no xanthelasmas [] : no respiratory distress [Respiration, Rhythm And Depth] : normal respiratory rhythm and effort [Exaggerated Use Of Accessory Muscles For Inspiration] : no accessory muscle use [Auscultation Breath Sounds / Voice Sounds] : lungs were clear to auscultation bilaterally [Heart Rate And Rhythm] : heart rate and rhythm were normal [Heart Sounds] : normal S1 and S2 [Murmurs] : no murmurs present [Edema] : no peripheral edema present [Bowel Sounds] : normal bowel sounds [Abdomen Soft] : soft [Abdomen Tenderness] : non-tender [Abnormal Walk] : normal gait [Nail Clubbing] : no clubbing of the fingernails [Cyanosis, Localized] : no localized cyanosis [Skin Color & Pigmentation] : normal skin color and pigmentation [Skin Turgor] : normal skin turgor [Oriented To Time, Place, And Person] : oriented to person, place, and time [Affect] : the affect was normal [FreeTextEntry1] : no JVD

## 2022-09-06 NOTE — ASSESSMENT
[FreeTextEntry1] : CAD by CT scan\par No surgical disease\par LAD lesion - negative nuclear MPI in anterior wall. No LAD ischemia. Mild lesion in LCX - small inferior wall defect.\par DM, DL, HTN\par C/w non-obstructive CAD\par \par In the absence of significant symptoms, would c/w medical therapy\par C/w ASA, statin. DM, Lipid control.\par BP elevated - increase Coreg to 6.25 mg q12\par C/w Nifedipine\par Repeat stress test - had atypical chest pain.\par \par Negative event monitor. C/w BP control.\par \par Labs with PMD - including HgA1c and Lipid panel.\par \par Patient was advised about healthy lifestyle changes, including diet and exercise. Importance of sustained long-term weight loss was discussed, questions answered.\par \par \par F/u in 6-12 months.\par

## 2022-09-06 NOTE — HISTORY OF PRESENT ILLNESS
[FreeTextEntry1] : 66 y/o male with history of HTN, DM, DL,  Post  [facility] ER  visit  on 11/2020 for  chest discomfort. CCTA was abnormal with LAD lesion, which was not well evaluated due to motion artifact. The patient also had a non-obstructive lesion in the LCX. RCA was small (non-dominant).  Had an episode of chest pain - last stress test in 2020. No palpitations. Clinically stable. Had dizziness in June - resolved. He decreased labetalol to 100 mg. LDL is controlled.\par \par Holter/MCOT - negative for sustained arrhythmia\par Nuclear MPI - small inferior defect.\par Pt  doesn't exercise, has stairs in his house and has no SOB no chest pain, no edema.  Hx of palpitations-- resolved.  Teaching is provided importance to comply with low cholesterol, low sodium, diabetic diet.

## 2022-11-02 ENCOUNTER — OUTPATIENT (OUTPATIENT)
Dept: OUTPATIENT SERVICES | Facility: HOSPITAL | Age: 67
LOS: 1 days | Discharge: HOME | End: 2022-11-02

## 2022-11-02 ENCOUNTER — TRANSCRIPTION ENCOUNTER (OUTPATIENT)
Age: 67
End: 2022-11-02

## 2022-11-02 VITALS
SYSTOLIC BLOOD PRESSURE: 148 MMHG | RESPIRATION RATE: 17 BRPM | HEART RATE: 73 BPM | HEIGHT: 64 IN | DIASTOLIC BLOOD PRESSURE: 67 MMHG | WEIGHT: 207.9 LBS | TEMPERATURE: 98 F | OXYGEN SATURATION: 98 %

## 2022-11-02 VITALS — RESPIRATION RATE: 17 BRPM | DIASTOLIC BLOOD PRESSURE: 64 MMHG | SYSTOLIC BLOOD PRESSURE: 142 MMHG | HEART RATE: 83 BPM

## 2022-11-02 LAB — GLUCOSE BLDC GLUCOMTR-MCNC: 121 MG/DL — HIGH (ref 70–99)

## 2022-11-07 DIAGNOSIS — I12.9 HYPERTENSIVE CHRONIC KIDNEY DISEASE WITH STAGE 1 THROUGH STAGE 4 CHRONIC KIDNEY DISEASE, OR UNSPECIFIED CHRONIC KIDNEY DISEASE: ICD-10-CM

## 2022-11-07 DIAGNOSIS — H26.8 OTHER SPECIFIED CATARACT: ICD-10-CM

## 2022-11-07 DIAGNOSIS — E11.22 TYPE 2 DIABETES MELLITUS WITH DIABETIC CHRONIC KIDNEY DISEASE: ICD-10-CM

## 2022-11-07 DIAGNOSIS — H57.04 MYDRIASIS: ICD-10-CM

## 2022-11-07 DIAGNOSIS — E78.5 HYPERLIPIDEMIA, UNSPECIFIED: ICD-10-CM

## 2022-11-07 DIAGNOSIS — N18.30 CHRONIC KIDNEY DISEASE, STAGE 3 UNSPECIFIED: ICD-10-CM

## 2022-11-07 DIAGNOSIS — E11.36 TYPE 2 DIABETES MELLITUS WITH DIABETIC CATARACT: ICD-10-CM

## 2022-11-08 ENCOUNTER — OUTPATIENT (OUTPATIENT)
Dept: OUTPATIENT SERVICES | Facility: HOSPITAL | Age: 67
LOS: 1 days | Discharge: HOME | End: 2022-11-08

## 2022-11-08 ENCOUNTER — RESULT REVIEW (OUTPATIENT)
Age: 67
End: 2022-11-08

## 2022-11-08 DIAGNOSIS — I25.10 ATHEROSCLEROTIC HEART DISEASE OF NATIVE CORONARY ARTERY WITHOUT ANGINA PECTORIS: ICD-10-CM

## 2022-11-08 PROCEDURE — 78452 HT MUSCLE IMAGE SPECT MULT: CPT | Mod: 26

## 2022-11-14 ENCOUNTER — NON-APPOINTMENT (OUTPATIENT)
Age: 67
End: 2022-11-14

## 2022-11-16 ENCOUNTER — NON-APPOINTMENT (OUTPATIENT)
Age: 67
End: 2022-11-16

## 2022-11-17 ENCOUNTER — EMERGENCY (EMERGENCY)
Facility: HOSPITAL | Age: 67
LOS: 0 days | Discharge: HOME | End: 2022-11-17
Attending: EMERGENCY MEDICINE | Admitting: EMERGENCY MEDICINE

## 2022-11-17 VITALS
SYSTOLIC BLOOD PRESSURE: 135 MMHG | HEIGHT: 64 IN | RESPIRATION RATE: 18 BRPM | DIASTOLIC BLOOD PRESSURE: 63 MMHG | OXYGEN SATURATION: 97 % | TEMPERATURE: 98 F | HEART RATE: 82 BPM

## 2022-11-17 VITALS
DIASTOLIC BLOOD PRESSURE: 59 MMHG | SYSTOLIC BLOOD PRESSURE: 126 MMHG | TEMPERATURE: 96 F | HEART RATE: 72 BPM | OXYGEN SATURATION: 97 % | RESPIRATION RATE: 18 BRPM

## 2022-11-17 DIAGNOSIS — R53.1 WEAKNESS: ICD-10-CM

## 2022-11-17 DIAGNOSIS — U07.1 COVID-19: ICD-10-CM

## 2022-11-17 DIAGNOSIS — E78.5 HYPERLIPIDEMIA, UNSPECIFIED: ICD-10-CM

## 2022-11-17 DIAGNOSIS — I10 ESSENTIAL (PRIMARY) HYPERTENSION: ICD-10-CM

## 2022-11-17 DIAGNOSIS — Z79.82 LONG TERM (CURRENT) USE OF ASPIRIN: ICD-10-CM

## 2022-11-17 DIAGNOSIS — E87.1 HYPO-OSMOLALITY AND HYPONATREMIA: ICD-10-CM

## 2022-11-17 DIAGNOSIS — Z79.84 LONG TERM (CURRENT) USE OF ORAL HYPOGLYCEMIC DRUGS: ICD-10-CM

## 2022-11-17 DIAGNOSIS — R50.9 FEVER, UNSPECIFIED: ICD-10-CM

## 2022-11-17 DIAGNOSIS — E11.9 TYPE 2 DIABETES MELLITUS WITHOUT COMPLICATIONS: ICD-10-CM

## 2022-11-17 LAB
ALBUMIN SERPL ELPH-MCNC: 4.3 G/DL — SIGNIFICANT CHANGE UP (ref 3.5–5.2)
ALP SERPL-CCNC: 29 U/L — LOW (ref 30–115)
ALT FLD-CCNC: 12 U/L — SIGNIFICANT CHANGE UP (ref 0–41)
ANION GAP SERPL CALC-SCNC: 11 MMOL/L — SIGNIFICANT CHANGE UP (ref 7–14)
AST SERPL-CCNC: 30 U/L — SIGNIFICANT CHANGE UP (ref 0–41)
BASE EXCESS BLDV CALC-SCNC: -2.4 MMOL/L — LOW (ref -2–3)
BASOPHILS # BLD AUTO: 0.06 K/UL — SIGNIFICANT CHANGE UP (ref 0–0.2)
BASOPHILS NFR BLD AUTO: 1.1 % — HIGH (ref 0–1)
BILIRUB SERPL-MCNC: 0.5 MG/DL — SIGNIFICANT CHANGE UP (ref 0.2–1.2)
BUN SERPL-MCNC: 19 MG/DL — SIGNIFICANT CHANGE UP (ref 10–20)
CA-I SERPL-SCNC: 1.15 MMOL/L — SIGNIFICANT CHANGE UP (ref 1.15–1.33)
CALCIUM SERPL-MCNC: 9.2 MG/DL — SIGNIFICANT CHANGE UP (ref 8.4–10.5)
CHLORIDE SERPL-SCNC: 90 MMOL/L — LOW (ref 98–110)
CO2 SERPL-SCNC: 23 MMOL/L — SIGNIFICANT CHANGE UP (ref 17–32)
CREAT SERPL-MCNC: 1.6 MG/DL — HIGH (ref 0.7–1.5)
EGFR: 47 ML/MIN/1.73M2 — LOW
EOSINOPHIL # BLD AUTO: 0.38 K/UL — SIGNIFICANT CHANGE UP (ref 0–0.7)
EOSINOPHIL NFR BLD AUTO: 6.7 % — SIGNIFICANT CHANGE UP (ref 0–8)
GAS PNL BLDV: 125 MMOL/L — LOW (ref 136–145)
GAS PNL BLDV: SIGNIFICANT CHANGE UP
GAS PNL BLDV: SIGNIFICANT CHANGE UP
GLUCOSE SERPL-MCNC: 127 MG/DL — HIGH (ref 70–99)
HCO3 BLDV-SCNC: 22 MMOL/L — SIGNIFICANT CHANGE UP (ref 22–29)
HCT VFR BLD CALC: 33.8 % — LOW (ref 42–52)
HCT VFR BLDA CALC: 31 % — LOW (ref 39–51)
HGB BLD CALC-MCNC: 10.2 G/DL — LOW (ref 12.6–17.4)
HGB BLD-MCNC: 11.5 G/DL — LOW (ref 14–18)
IMM GRANULOCYTES NFR BLD AUTO: 0.5 % — HIGH (ref 0.1–0.3)
LACTATE BLDV-MCNC: 1.1 MMOL/L — SIGNIFICANT CHANGE UP (ref 0.5–2)
LYMPHOCYTES # BLD AUTO: 1.56 K/UL — SIGNIFICANT CHANGE UP (ref 1.2–3.4)
LYMPHOCYTES # BLD AUTO: 27.7 % — SIGNIFICANT CHANGE UP (ref 20.5–51.1)
MCHC RBC-ENTMCNC: 27.2 PG — SIGNIFICANT CHANGE UP (ref 27–31)
MCHC RBC-ENTMCNC: 34 G/DL — SIGNIFICANT CHANGE UP (ref 32–37)
MCV RBC AUTO: 79.9 FL — LOW (ref 80–94)
MONOCYTES # BLD AUTO: 0.63 K/UL — HIGH (ref 0.1–0.6)
MONOCYTES NFR BLD AUTO: 11.2 % — HIGH (ref 1.7–9.3)
NEUTROPHILS # BLD AUTO: 2.97 K/UL — SIGNIFICANT CHANGE UP (ref 1.4–6.5)
NEUTROPHILS NFR BLD AUTO: 52.8 % — SIGNIFICANT CHANGE UP (ref 42.2–75.2)
NRBC # BLD: 0 /100 WBCS — SIGNIFICANT CHANGE UP (ref 0–0)
PCO2 BLDV: 38 MMHG — LOW (ref 42–55)
PH BLDV: 7.38 — SIGNIFICANT CHANGE UP (ref 7.32–7.43)
PLATELET # BLD AUTO: 349 K/UL — SIGNIFICANT CHANGE UP (ref 130–400)
PO2 BLDV: 28 MMHG — SIGNIFICANT CHANGE UP
POTASSIUM BLDV-SCNC: 4.1 MMOL/L — SIGNIFICANT CHANGE UP (ref 3.5–5.1)
POTASSIUM SERPL-MCNC: 4 MMOL/L — SIGNIFICANT CHANGE UP (ref 3.5–5)
POTASSIUM SERPL-SCNC: 4 MMOL/L — SIGNIFICANT CHANGE UP (ref 3.5–5)
PROT SERPL-MCNC: 6.7 G/DL — SIGNIFICANT CHANGE UP (ref 6–8)
RBC # BLD: 4.23 M/UL — LOW (ref 4.7–6.1)
RBC # FLD: 13 % — SIGNIFICANT CHANGE UP (ref 11.5–14.5)
SAO2 % BLDV: 45.5 % — SIGNIFICANT CHANGE UP
SODIUM SERPL-SCNC: 124 MMOL/L — LOW (ref 135–146)
WBC # BLD: 5.63 K/UL — SIGNIFICANT CHANGE UP (ref 4.8–10.8)
WBC # FLD AUTO: 5.63 K/UL — SIGNIFICANT CHANGE UP (ref 4.8–10.8)

## 2022-11-17 PROCEDURE — 71045 X-RAY EXAM CHEST 1 VIEW: CPT | Mod: 26

## 2022-11-17 PROCEDURE — 99284 EMERGENCY DEPT VISIT MOD MDM: CPT

## 2022-11-17 RX ORDER — ACETAMINOPHEN 500 MG
975 TABLET ORAL ONCE
Refills: 0 | Status: COMPLETED | OUTPATIENT
Start: 2022-11-17 | End: 2022-11-17

## 2022-11-17 RX ORDER — KETOROLAC TROMETHAMINE 30 MG/ML
15 SYRINGE (ML) INJECTION ONCE
Refills: 0 | Status: DISCONTINUED | OUTPATIENT
Start: 2022-11-17 | End: 2022-11-17

## 2022-11-17 RX ORDER — SODIUM CHLORIDE 9 MG/ML
1000 INJECTION INTRAMUSCULAR; INTRAVENOUS; SUBCUTANEOUS ONCE
Refills: 0 | Status: COMPLETED | OUTPATIENT
Start: 2022-11-17 | End: 2022-11-17

## 2022-11-17 RX ADMIN — SODIUM CHLORIDE 1000 MILLILITER(S): 9 INJECTION INTRAMUSCULAR; INTRAVENOUS; SUBCUTANEOUS at 05:52

## 2022-11-17 RX ADMIN — Medication 15 MILLIGRAM(S): at 04:31

## 2022-11-17 RX ADMIN — Medication 975 MILLIGRAM(S): at 04:32

## 2022-11-17 RX ADMIN — SODIUM CHLORIDE 1000 MILLILITER(S): 9 INJECTION INTRAMUSCULAR; INTRAVENOUS; SUBCUTANEOUS at 05:38

## 2022-11-17 RX ADMIN — SODIUM CHLORIDE 1000 MILLILITER(S): 9 INJECTION INTRAMUSCULAR; INTRAVENOUS; SUBCUTANEOUS at 04:31

## 2022-11-17 NOTE — ED ADULT TRIAGE NOTE - CHIEF COMPLAINT QUOTE
pt tested covid + yesterday and now he is feeling like his temperature is very high and he feels weak

## 2022-11-17 NOTE — ED PROVIDER NOTE - NSICDXPASTMEDICALHX_GEN_ALL_CORE_FT
Bina Reed)
PAST MEDICAL HISTORY:  DM (diabetes mellitus)     HLD (hyperlipidemia)     HTN (hypertension)

## 2022-11-17 NOTE — ED PROVIDER NOTE - OBJECTIVE STATEMENT
67-year-old male past medical history diabetes, hypertension, hyperlipidemia presenting to the ED for evaluation of generalized weakness, fever, chills x5 days.  Patient reports 5 days ago tested positive for COVID at urgent care, completed course of paxlovid.  Patient reporting he feels like his whole body is hot.  Denies any chest pain, shortness of breath, extremity swelling, calf pain, nausea, vomiting, abdominal pain, diarrhea

## 2022-11-17 NOTE — ED PROVIDER NOTE - PHYSICAL EXAMINATION
GENERAL: Well-nourished, Well-developed. NAD.  HEAD: No visible or palpable bumps or hematomas. No ecchymosis behind ears B/L.  Eyes: PERRLA, EOMI. No asymmetry. No nystagmus. No conjunctival injection. Non-icteric sclera.  ENMT: MMM.   Neck: Supple. FROM  CVS: Normal S1,S2. No murmurs appreciated on auscultation   RESP: No use of accessory muscles. Chest rise symmetrical with good expansion. Lungs clear to auscultation B/L. No wheezing, rales, or rhonchi auscultated.  GI: Normal auscultation of bowel sounds in all 4 quadrants. Soft, Nontender, Nondistended. No guarding or rebound tenderness. No CVAT B/L.  Skin: Warm, Dry. No rashes or lesions. Good cap refill < 2 sec B/L.  EXT: Radial and pedal pulses present B/L. No calf tenderness or swelling B/L. No palpable cords. No pedal edema B/L.

## 2022-11-17 NOTE — ED PROVIDER NOTE - CLINICAL SUMMARY MEDICAL DECISION MAKING FREE TEXT BOX
Covid + patient presents with generalized sensation of heat. Required labs, imaging, meds. Will be discharged with outpt f/up.

## 2022-11-17 NOTE — ED PROVIDER NOTE - PATIENT PORTAL LINK FT
You can access the FollowMyHealth Patient Portal offered by [facility] by registering at the following website: [email or web address]. By joining [employer]’s FollowMyHealth portal, you will also be able to view your health information using other applications (apps) compatible with our system.

## 2022-11-17 NOTE — ED PROVIDER NOTE - PROGRESS NOTE DETAILS
Sodium resulted 125 on VBG. Pt endorsed has been on a low salt diet and has been drinking increased water 2/2 Covid diagnosis. Likely these two factors contributed to his hyponatremia. Has follow up with PMD and stressed the need for currently some salt consumption and re-checking this value with his PMD. Pt is a very reliable follow up.

## 2022-11-17 NOTE — ED PROVIDER NOTE - NS ED ROS FT
Constitutional: (+) fever, chills, malaise  Eyes:  No visual changes  ENMT:  No neck pain  Cardiac:  No chest pain  Respiratory:  No cough, SOB  GI:  No nausea, vomiting, diarrhea, abdominal pain.  :  No dysuria, hematuria  MS:  No back pain.  Neuro:  No headache or lightheadedness  Skin:  No skin rash    Except as documented in the HPI,  all other systems are negative.

## 2022-11-17 NOTE — ED PROVIDER NOTE - NSFOLLOWUPINSTRUCTIONS_ED_ALL_ED_FT
Hyponatremia      Hyponatremia is when the amount of salt (sodium) in a person's blood is too low. When sodium levels are low, the cells absorb extra water, which causes them to swell. The swelling happens throughout the body, but it mostly affects the brain.      What are the causes?    This condition may be caused by:•Certain medical conditions, such as:  •Heart, kidney, or liver problems.      •Thyroid problems.      •Adrenal gland problems.      •Metabolic conditions, such as Michael's disease or syndrome of inappropriate antidiuresis (SIAD).        •Excessive vomiting, diarrhea, or sweating.      •Certain medicines or illegal drugs.      •Fluids given through an IV.        What increases the risk?    You are more likely to develop this condition if you:  •Have certain medical conditions such as heart, kidney, or liver failure.      •Have a medical condition that causes frequent or excessive diarrhea.      •Participate in intense physical activities, such as marathon running.    •Take certain medicines that affect the sodium and fluid balance in the blood. Some of these medicine types include:  •Diuretics.      •NSAIDs, such as ibuprofen.      •Some opioid pain medicines.      •Some antidepressants.      •Some seizure prevention medicines.          What are the signs or symptoms?    Symptoms of this condition include:  •Headache.      •Nausea and vomiting.      •Being very tired (lethargic).      •Muscle weakness and cramping.      •Loss of appetite.      •Feeling weak or light-headed.      Severe symptoms of this condition include:  •Confusion.      •Agitation.      •Having a rapid heart rate.      •Fainting.      •Seizures.      •Coma.        How is this diagnosed?    This condition is diagnosed based on:  •A physical exam.      •Your medical history.    •Tests, including:  •Blood tests.      •Urine tests.          How is this treated?  A person receiving fluids through an IV in the arm. The person is in a hospital bed.   Treatment for this condition depends on the cause. Treatment may include:  •Getting fluids through an IV that is inserted into one of your veins.      •Medicines to correct the sodium imbalance. If medicines are causing the condition, the medicines will need to be adjusted.      •Limiting your water or fluid intake to get the correct sodium balance, in certain cases.      •Monitoring in the hospital to closely watch your symptoms for improvement.        Follow these instructions at home:  A sign showing that a person should not drink alcohol.   •Take over-the-counter and prescription medicines only as told by your health care provider. Many medicines can make this condition worse. Talk with your health care provider about any medicines that you are currently taking.      • Do not drink alcohol.      •Keep all follow-up visits. This is important.        Contact a health care provider if:    •You develop worsening nausea, fatigue, headache, confusion, or weakness.      •Your symptoms go away and then return.        Get help right away if:    •You have a seizure.      •You faint.      •You have ongoing diarrhea or vomiting.        Summary    •Hyponatremia is when the amount of salt (sodium) in your blood is too low.      •When sodium levels are low, your cells absorb extra water, which causes them to swell.      •The swelling happens throughout the body, but it mostly affects the brain.      •Treatment for this condition depends on the cause. It may include receiving IV fluids, taking or adjusting medicines, limiting fluid intake, and monitoring in the hospital.      This information is not intended to replace advice given to you by your health care provider. Make sure you discuss any questions you have with your health care provider.

## 2022-11-17 NOTE — ED PROVIDER NOTE - ATTENDING CONTRIBUTION TO CARE
I personally evaluated the patient. I reviewed the Resident’s or Physician Assistant’s note (as assigned above), and agree with the findings and plan except as documented in my note.  Pt with hx of HTN, DM, HLD, with recent diagnosis of Covid, s/p Paxlovid use with complaints of feeling hot all over, generalized weakness and some LE muscle aches. Denies CP, SOB, coughing, LE  pain or swelling, lightheadedness or LOC. VS reviewed, pt non-toxic appearing, NAD. Head ncat, MMM, pharyngeal exam w/o erythema, edema or exudates. B/l TM wnl. neck supple, normal ROM, normal s1s2 without any murmurs, Lungs CTAB with normal work of breathing. abd +BS, s/nd/nt, extremities without edema or cyanosis, neuro exam grossly normal. No acute skin rashes. Plan is labs, imaging , meds, IVF and reassess.

## 2022-11-17 NOTE — ED PROVIDER NOTE - NS ED ATTENDING STATEMENT MOD
I have seen and examined this patient and fully participated in the care of this patient as the teaching attending.  The service was shared with the LIAN.  I reviewed and verified the documentation and independently performed the documented:

## 2022-11-30 ENCOUNTER — OUTPATIENT (OUTPATIENT)
Dept: OUTPATIENT SERVICES | Facility: HOSPITAL | Age: 67
LOS: 1 days | Discharge: HOME | End: 2022-11-30

## 2022-11-30 ENCOUNTER — TRANSCRIPTION ENCOUNTER (OUTPATIENT)
Age: 67
End: 2022-11-30

## 2022-11-30 VITALS
SYSTOLIC BLOOD PRESSURE: 141 MMHG | HEIGHT: 65 IN | HEART RATE: 67 BPM | TEMPERATURE: 98 F | WEIGHT: 201.06 LBS | RESPIRATION RATE: 17 BRPM | DIASTOLIC BLOOD PRESSURE: 65 MMHG | OXYGEN SATURATION: 99 %

## 2022-11-30 VITALS — DIASTOLIC BLOOD PRESSURE: 64 MMHG | SYSTOLIC BLOOD PRESSURE: 133 MMHG | RESPIRATION RATE: 17 BRPM | HEART RATE: 67 BPM

## 2022-11-30 DIAGNOSIS — Z98.42 CATARACT EXTRACTION STATUS, LEFT EYE: Chronic | ICD-10-CM

## 2022-11-30 LAB — GLUCOSE BLDC GLUCOMTR-MCNC: 143 MG/DL — HIGH (ref 70–99)

## 2022-11-30 NOTE — ASU PATIENT PROFILE, ADULT - NSICDXPASTSURGICALHX_GEN_ALL_CORE_FT
PAST SURGICAL HISTORY:  Status post cataract extraction and insertion of intraocular lens of left eye

## 2022-11-30 NOTE — ASU PATIENT PROFILE, ADULT - FALL HARM RISK - HARM RISK INTERVENTIONS

## 2022-11-30 NOTE — ASU DISCHARGE PLAN (ADULT/PEDIATRIC) - NS MD DC FALL RISK RISK
For information on Fall & Injury Prevention, visit: [email or web address] OR  [email or web address] OR  https://www.cdc.gov/steadi/patient.html

## 2022-12-02 DIAGNOSIS — I10 ESSENTIAL (PRIMARY) HYPERTENSION: ICD-10-CM

## 2022-12-02 DIAGNOSIS — H52.201 UNSPECIFIED ASTIGMATISM, RIGHT EYE: ICD-10-CM

## 2022-12-02 DIAGNOSIS — Z79.82 LONG TERM (CURRENT) USE OF ASPIRIN: ICD-10-CM

## 2022-12-02 DIAGNOSIS — E11.9 TYPE 2 DIABETES MELLITUS WITHOUT COMPLICATIONS: ICD-10-CM

## 2022-12-02 DIAGNOSIS — H25.11 AGE-RELATED NUCLEAR CATARACT, RIGHT EYE: ICD-10-CM

## 2022-12-02 DIAGNOSIS — Z79.84 LONG TERM (CURRENT) USE OF ORAL HYPOGLYCEMIC DRUGS: ICD-10-CM

## 2023-04-08 ENCOUNTER — INPATIENT (INPATIENT)
Facility: HOSPITAL | Age: 68
LOS: 1 days | Discharge: HOME | DRG: 918 | End: 2023-04-10
Attending: INTERNAL MEDICINE | Admitting: INTERNAL MEDICINE
Payer: COMMERCIAL

## 2023-04-08 VITALS
OXYGEN SATURATION: 98 % | HEIGHT: 67 IN | DIASTOLIC BLOOD PRESSURE: 55 MMHG | SYSTOLIC BLOOD PRESSURE: 85 MMHG | WEIGHT: 210.98 LBS | TEMPERATURE: 97 F | HEART RATE: 45 BPM

## 2023-04-08 DIAGNOSIS — Z98.42 CATARACT EXTRACTION STATUS, LEFT EYE: Chronic | ICD-10-CM

## 2023-04-08 DIAGNOSIS — R55 SYNCOPE AND COLLAPSE: ICD-10-CM

## 2023-04-08 LAB
ALBUMIN SERPL ELPH-MCNC: 4.4 G/DL — SIGNIFICANT CHANGE UP (ref 3.5–5.2)
ALP SERPL-CCNC: 42 U/L — SIGNIFICANT CHANGE UP (ref 30–115)
ALT FLD-CCNC: <5 U/L — SIGNIFICANT CHANGE UP (ref 0–41)
ANION GAP SERPL CALC-SCNC: 10 MMOL/L — SIGNIFICANT CHANGE UP (ref 7–14)
ANION GAP SERPL CALC-SCNC: 12 MMOL/L — SIGNIFICANT CHANGE UP (ref 7–14)
APTT BLD: 34.9 SEC — SIGNIFICANT CHANGE UP (ref 27–39.2)
AST SERPL-CCNC: 14 U/L — SIGNIFICANT CHANGE UP (ref 0–41)
BASE EXCESS BLDV CALC-SCNC: -0.5 MMOL/L — SIGNIFICANT CHANGE UP (ref -2–3)
BASOPHILS # BLD AUTO: 0.11 K/UL — SIGNIFICANT CHANGE UP (ref 0–0.2)
BASOPHILS NFR BLD AUTO: 1.2 % — HIGH (ref 0–1)
BILIRUB SERPL-MCNC: 0.3 MG/DL — SIGNIFICANT CHANGE UP (ref 0.2–1.2)
BLD GP AB SCN SERPL QL: SIGNIFICANT CHANGE UP
BUN SERPL-MCNC: 39 MG/DL — HIGH (ref 10–20)
BUN SERPL-MCNC: 43 MG/DL — HIGH (ref 10–20)
CA-I SERPL-SCNC: 1.33 MMOL/L — SIGNIFICANT CHANGE UP (ref 1.15–1.33)
CALCIUM SERPL-MCNC: 10.2 MG/DL — SIGNIFICANT CHANGE UP (ref 8.4–10.5)
CALCIUM SERPL-MCNC: 10.2 MG/DL — SIGNIFICANT CHANGE UP (ref 8.4–10.5)
CHLORIDE SERPL-SCNC: 104 MMOL/L — SIGNIFICANT CHANGE UP (ref 98–110)
CHLORIDE SERPL-SCNC: 106 MMOL/L — SIGNIFICANT CHANGE UP (ref 98–110)
CO2 SERPL-SCNC: 23 MMOL/L — SIGNIFICANT CHANGE UP (ref 17–32)
CO2 SERPL-SCNC: 24 MMOL/L — SIGNIFICANT CHANGE UP (ref 17–32)
CREAT SERPL-MCNC: 1.7 MG/DL — HIGH (ref 0.7–1.5)
CREAT SERPL-MCNC: 2 MG/DL — HIGH (ref 0.7–1.5)
EGFR: 36 ML/MIN/1.73M2 — LOW
EGFR: 43 ML/MIN/1.73M2 — LOW
EOSINOPHIL # BLD AUTO: 0.35 K/UL — SIGNIFICANT CHANGE UP (ref 0–0.7)
EOSINOPHIL NFR BLD AUTO: 3.9 % — SIGNIFICANT CHANGE UP (ref 0–8)
GAS PNL BLDV: 133 MMOL/L — LOW (ref 136–145)
GAS PNL BLDV: SIGNIFICANT CHANGE UP
GAS PNL BLDV: SIGNIFICANT CHANGE UP
GLUCOSE BLDC GLUCOMTR-MCNC: 153 MG/DL — HIGH (ref 70–99)
GLUCOSE SERPL-MCNC: 156 MG/DL — HIGH (ref 70–99)
GLUCOSE SERPL-MCNC: 288 MG/DL — HIGH (ref 70–99)
HCO3 BLDV-SCNC: 25 MMOL/L — SIGNIFICANT CHANGE UP (ref 22–29)
HCT VFR BLD CALC: 36.7 % — LOW (ref 42–52)
HCT VFR BLDA CALC: 37 % — LOW (ref 39–51)
HGB BLD CALC-MCNC: 12.2 G/DL — LOW (ref 12.6–17.4)
HGB BLD-MCNC: 11.8 G/DL — LOW (ref 14–18)
IMM GRANULOCYTES NFR BLD AUTO: 0.3 % — SIGNIFICANT CHANGE UP (ref 0.1–0.3)
INR BLD: 1.07 RATIO — SIGNIFICANT CHANGE UP (ref 0.65–1.3)
LACTATE BLDV-MCNC: 1.9 MMOL/L — SIGNIFICANT CHANGE UP (ref 0.5–2)
LYMPHOCYTES # BLD AUTO: 1.48 K/UL — SIGNIFICANT CHANGE UP (ref 1.2–3.4)
LYMPHOCYTES # BLD AUTO: 16.5 % — LOW (ref 20.5–51.1)
MAGNESIUM SERPL-MCNC: 1.9 MG/DL — SIGNIFICANT CHANGE UP (ref 1.8–2.4)
MCHC RBC-ENTMCNC: 27.4 PG — SIGNIFICANT CHANGE UP (ref 27–31)
MCHC RBC-ENTMCNC: 32.2 G/DL — SIGNIFICANT CHANGE UP (ref 32–37)
MCV RBC AUTO: 85.3 FL — SIGNIFICANT CHANGE UP (ref 80–94)
MONOCYTES # BLD AUTO: 0.63 K/UL — HIGH (ref 0.1–0.6)
MONOCYTES NFR BLD AUTO: 7 % — SIGNIFICANT CHANGE UP (ref 1.7–9.3)
NEUTROPHILS # BLD AUTO: 6.36 K/UL — SIGNIFICANT CHANGE UP (ref 1.4–6.5)
NEUTROPHILS NFR BLD AUTO: 71.1 % — SIGNIFICANT CHANGE UP (ref 42.2–75.2)
NRBC # BLD: 0 /100 WBCS — SIGNIFICANT CHANGE UP (ref 0–0)
PCO2 BLDV: 46 MMHG — SIGNIFICANT CHANGE UP (ref 42–55)
PH BLDV: 7.35 — SIGNIFICANT CHANGE UP (ref 7.32–7.43)
PLATELET # BLD AUTO: 316 K/UL — SIGNIFICANT CHANGE UP (ref 130–400)
PO2 BLDV: 30 MMHG — SIGNIFICANT CHANGE UP
POTASSIUM BLDV-SCNC: 5.7 MMOL/L — HIGH (ref 3.5–5.1)
POTASSIUM SERPL-MCNC: 4.4 MMOL/L — SIGNIFICANT CHANGE UP (ref 3.5–5)
POTASSIUM SERPL-MCNC: 5.7 MMOL/L — HIGH (ref 3.5–5)
POTASSIUM SERPL-SCNC: 4.4 MMOL/L — SIGNIFICANT CHANGE UP (ref 3.5–5)
POTASSIUM SERPL-SCNC: 5.7 MMOL/L — HIGH (ref 3.5–5)
PROT SERPL-MCNC: 7 G/DL — SIGNIFICANT CHANGE UP (ref 6–8)
PROTHROM AB SERPL-ACNC: 12.2 SEC — SIGNIFICANT CHANGE UP (ref 9.95–12.87)
RBC # BLD: 4.3 M/UL — LOW (ref 4.7–6.1)
RBC # FLD: 14.1 % — SIGNIFICANT CHANGE UP (ref 11.5–14.5)
SAO2 % BLDV: 43.7 % — SIGNIFICANT CHANGE UP
SARS-COV-2 RNA SPEC QL NAA+PROBE: SIGNIFICANT CHANGE UP
SODIUM SERPL-SCNC: 137 MMOL/L — SIGNIFICANT CHANGE UP (ref 135–146)
SODIUM SERPL-SCNC: 142 MMOL/L — SIGNIFICANT CHANGE UP (ref 135–146)
T4 AB SER-ACNC: 8.3 UG/DL — SIGNIFICANT CHANGE UP (ref 4.6–12)
TROPONIN T SERPL-MCNC: <0.01 NG/ML — SIGNIFICANT CHANGE UP
TROPONIN T SERPL-MCNC: <0.01 NG/ML — SIGNIFICANT CHANGE UP
TSH SERPL-MCNC: 4.19 UIU/ML — SIGNIFICANT CHANGE UP (ref 0.27–4.2)
WBC # BLD: 8.96 K/UL — SIGNIFICANT CHANGE UP (ref 4.8–10.8)
WBC # FLD AUTO: 8.96 K/UL — SIGNIFICANT CHANGE UP (ref 4.8–10.8)

## 2023-04-08 PROCEDURE — 99285 EMERGENCY DEPT VISIT HI MDM: CPT | Mod: GC

## 2023-04-08 PROCEDURE — 84443 ASSAY THYROID STIM HORMONE: CPT

## 2023-04-08 PROCEDURE — 83036 HEMOGLOBIN GLYCOSYLATED A1C: CPT

## 2023-04-08 PROCEDURE — 71045 X-RAY EXAM CHEST 1 VIEW: CPT | Mod: 26

## 2023-04-08 PROCEDURE — 93308 TTE F-UP OR LMTD: CPT | Mod: 26

## 2023-04-08 PROCEDURE — 83735 ASSAY OF MAGNESIUM: CPT

## 2023-04-08 PROCEDURE — 85027 COMPLETE CBC AUTOMATED: CPT

## 2023-04-08 PROCEDURE — 84484 ASSAY OF TROPONIN QUANT: CPT

## 2023-04-08 PROCEDURE — 85025 COMPLETE CBC W/AUTO DIFF WBC: CPT

## 2023-04-08 PROCEDURE — 93010 ELECTROCARDIOGRAM REPORT: CPT

## 2023-04-08 PROCEDURE — 93306 TTE W/DOPPLER COMPLETE: CPT

## 2023-04-08 PROCEDURE — 82962 GLUCOSE BLOOD TEST: CPT

## 2023-04-08 PROCEDURE — 80048 BASIC METABOLIC PNL TOTAL CA: CPT

## 2023-04-08 PROCEDURE — 99223 1ST HOSP IP/OBS HIGH 75: CPT

## 2023-04-08 PROCEDURE — 86618 LYME DISEASE ANTIBODY: CPT

## 2023-04-08 PROCEDURE — 93005 ELECTROCARDIOGRAM TRACING: CPT

## 2023-04-08 PROCEDURE — 80061 LIPID PANEL: CPT

## 2023-04-08 PROCEDURE — 36415 COLL VENOUS BLD VENIPUNCTURE: CPT

## 2023-04-08 PROCEDURE — 80053 COMPREHEN METABOLIC PANEL: CPT

## 2023-04-08 PROCEDURE — 86803 HEPATITIS C AB TEST: CPT

## 2023-04-08 RX ORDER — SODIUM CHLORIDE 9 MG/ML
1000 INJECTION, SOLUTION INTRAVENOUS ONCE
Refills: 0 | Status: COMPLETED | OUTPATIENT
Start: 2023-04-08 | End: 2023-04-08

## 2023-04-08 RX ORDER — FENOFIBRATE,MICRONIZED 130 MG
1 CAPSULE ORAL
Qty: 0 | Refills: 0 | DISCHARGE

## 2023-04-08 RX ORDER — HEPARIN SODIUM 5000 [USP'U]/ML
5000 INJECTION INTRAVENOUS; SUBCUTANEOUS EVERY 8 HOURS
Refills: 0 | Status: DISCONTINUED | OUTPATIENT
Start: 2023-04-08 | End: 2023-04-10

## 2023-04-08 RX ORDER — NIFEDIPINE 30 MG
60 TABLET, EXTENDED RELEASE 24 HR ORAL DAILY
Refills: 0 | Status: DISCONTINUED | OUTPATIENT
Start: 2023-04-08 | End: 2023-04-08

## 2023-04-08 RX ORDER — INSULIN LISPRO 100/ML
VIAL (ML) SUBCUTANEOUS
Refills: 0 | Status: DISCONTINUED | OUTPATIENT
Start: 2023-04-08 | End: 2023-04-10

## 2023-04-08 RX ORDER — DEXTROSE 50 % IN WATER 50 %
12.5 SYRINGE (ML) INTRAVENOUS ONCE
Refills: 0 | Status: DISCONTINUED | OUTPATIENT
Start: 2023-04-08 | End: 2023-04-10

## 2023-04-08 RX ORDER — GLUCAGON INJECTION, SOLUTION 0.5 MG/.1ML
1 INJECTION, SOLUTION SUBCUTANEOUS ONCE
Refills: 0 | Status: DISCONTINUED | OUTPATIENT
Start: 2023-04-08 | End: 2023-04-10

## 2023-04-08 RX ORDER — METFORMIN HYDROCHLORIDE 850 MG/1
1 TABLET ORAL
Qty: 0 | Refills: 0 | DISCHARGE

## 2023-04-08 RX ORDER — HYDROCORTISONE 20 MG
100 TABLET ORAL ONCE
Refills: 0 | Status: COMPLETED | OUTPATIENT
Start: 2023-04-08 | End: 2023-04-08

## 2023-04-08 RX ORDER — ATORVASTATIN CALCIUM 80 MG/1
20 TABLET, FILM COATED ORAL AT BEDTIME
Refills: 0 | Status: DISCONTINUED | OUTPATIENT
Start: 2023-04-08 | End: 2023-04-10

## 2023-04-08 RX ORDER — FENOFIBRATE,MICRONIZED 130 MG
145 CAPSULE ORAL DAILY
Refills: 0 | Status: DISCONTINUED | OUTPATIENT
Start: 2023-04-08 | End: 2023-04-10

## 2023-04-08 RX ORDER — DEXTROSE 50 % IN WATER 50 %
15 SYRINGE (ML) INTRAVENOUS ONCE
Refills: 0 | Status: DISCONTINUED | OUTPATIENT
Start: 2023-04-08 | End: 2023-04-10

## 2023-04-08 RX ORDER — INSULIN GLARGINE 100 [IU]/ML
15 INJECTION, SOLUTION SUBCUTANEOUS AT BEDTIME
Refills: 0 | Status: DISCONTINUED | OUTPATIENT
Start: 2023-04-08 | End: 2023-04-10

## 2023-04-08 RX ORDER — TELMISARTAN AND HYDROCHLOROTHIAZIDE 40; 12.5 MG/1; MG/1
1 TABLET ORAL
Qty: 0 | Refills: 0 | DISCHARGE

## 2023-04-08 RX ORDER — INSULIN LISPRO 100/ML
5 VIAL (ML) SUBCUTANEOUS
Refills: 0 | Status: DISCONTINUED | OUTPATIENT
Start: 2023-04-08 | End: 2023-04-10

## 2023-04-08 RX ORDER — ALBUTEROL 90 UG/1
2.5 AEROSOL, METERED ORAL ONCE
Refills: 0 | Status: COMPLETED | OUTPATIENT
Start: 2023-04-08 | End: 2023-04-08

## 2023-04-08 RX ORDER — ASPIRIN/CALCIUM CARB/MAGNESIUM 324 MG
1 TABLET ORAL
Qty: 0 | Refills: 0 | DISCHARGE

## 2023-04-08 RX ORDER — SODIUM CHLORIDE 9 MG/ML
1000 INJECTION, SOLUTION INTRAVENOUS
Refills: 0 | Status: DISCONTINUED | OUTPATIENT
Start: 2023-04-08 | End: 2023-04-10

## 2023-04-08 RX ORDER — LOSARTAN POTASSIUM 100 MG/1
100 TABLET, FILM COATED ORAL DAILY
Refills: 0 | Status: DISCONTINUED | OUTPATIENT
Start: 2023-04-08 | End: 2023-04-08

## 2023-04-08 RX ORDER — DEXTROSE 50 % IN WATER 50 %
25 SYRINGE (ML) INTRAVENOUS ONCE
Refills: 0 | Status: DISCONTINUED | OUTPATIENT
Start: 2023-04-08 | End: 2023-04-10

## 2023-04-08 RX ORDER — DEXTROSE 50 % IN WATER 50 %
50 SYRINGE (ML) INTRAVENOUS ONCE
Refills: 0 | Status: COMPLETED | OUTPATIENT
Start: 2023-04-08 | End: 2023-04-08

## 2023-04-08 RX ORDER — PREGABALIN 225 MG/1
1 CAPSULE ORAL
Qty: 0 | Refills: 0 | DISCHARGE

## 2023-04-08 RX ORDER — INSULIN HUMAN 100 [IU]/ML
10 INJECTION, SOLUTION SUBCUTANEOUS ONCE
Refills: 0 | Status: COMPLETED | OUTPATIENT
Start: 2023-04-08 | End: 2023-04-08

## 2023-04-08 RX ORDER — ATORVASTATIN CALCIUM 80 MG/1
1 TABLET, FILM COATED ORAL
Qty: 0 | Refills: 0 | DISCHARGE

## 2023-04-08 RX ORDER — NIFEDIPINE 30 MG
1 TABLET, EXTENDED RELEASE 24 HR ORAL
Qty: 0 | Refills: 0 | DISCHARGE

## 2023-04-08 RX ADMIN — Medication 50 MILLILITER(S): at 16:14

## 2023-04-08 RX ADMIN — Medication 100 MILLIGRAM(S): at 13:17

## 2023-04-08 RX ADMIN — ALBUTEROL 2.5 MILLIGRAM(S): 90 AEROSOL, METERED ORAL at 16:15

## 2023-04-08 RX ADMIN — Medication 2: at 21:59

## 2023-04-08 RX ADMIN — ATORVASTATIN CALCIUM 20 MILLIGRAM(S): 80 TABLET, FILM COATED ORAL at 21:58

## 2023-04-08 RX ADMIN — INSULIN HUMAN 10 UNIT(S): 100 INJECTION, SOLUTION SUBCUTANEOUS at 16:15

## 2023-04-08 RX ADMIN — INSULIN GLARGINE 15 UNIT(S): 100 INJECTION, SOLUTION SUBCUTANEOUS at 21:58

## 2023-04-08 RX ADMIN — SODIUM CHLORIDE 1000 MILLILITER(S): 9 INJECTION, SOLUTION INTRAVENOUS at 13:17

## 2023-04-08 RX ADMIN — HEPARIN SODIUM 5000 UNIT(S): 5000 INJECTION INTRAVENOUS; SUBCUTANEOUS at 21:58

## 2023-04-08 NOTE — ED PROVIDER NOTE - OBJECTIVE STATEMENT
67 y/o male with past medical history of diabetes hypertension remote history of CVA and chronic kidney disease not on dialysis presents to the emergency department with near syncopal episode that occurred at home.  Patient describes feeling lightheaded and dizzy.  Patient currently takes labetalol 50 mg twice a day and nifedipine 60 mg once a day.  Patient took his medications this morning and does not believe that he overdosed.  Patient still does not feel well in the emergency department and feels extremely fatigued.  Denies LOC head injury anticoagulation use recent fevers chills chest pain difficulty breathing nausea vomiting abdominal pain known history of thyroid disease or any other complaints.

## 2023-04-08 NOTE — ED PROVIDER NOTE - SECONDARY DIAGNOSIS.
Diet affects gallbladder.  Crackers, gatorade.   Broth, jello.  Avoid fat and green leafy veges.  That's about all you can do.    Near syncope Hyperkalemia

## 2023-04-08 NOTE — CONSULT NOTE ADULT - SUBJECTIVE AND OBJECTIVE BOX
Outpt cardiologist:    Reason for Consult:     HISTORY OF PRESENT ILLNESS:  HPI: 69 y/o male with past medical history of NIDDM, HTN, DLD, remote history of CVA, and chronic kidney disease not on dialysis presents to the emergency department with near syncopal episode that occurred at home. Patient describes feeling lightheaded and dizzy as he stood up from seated position. Patient currently takes labetalol 50 mg twice a day and nifedipine 60 mg once a day. Patient took his medications this morning and does not believe that he overdosed. Patient still does not feel well in the emergency department and feels extremely fatigued. Denies LOC head injury anticoagulation use recent fevers chills chest pain difficulty breathing nausea vomiting abdominal pain or any known history of thyroid disease or any other complaints. Patient reports chronic symptoms of constipation, cold intolerance, and fatigue but says that he had a TSH done 3 weeks ago that was normal.     In the ER:   VS: BP 85/55, HR 45, T 35.9 oral, SpO2 98% RA  EKG: Sinus Zachery + PACs  Sig Labs: Hgb 11.8, INR 1.07, K 5.7, Cr 2.0 (BL 1.6), Trop -ve  [place]ocus Heart: Normal EF, No pericardial effusion   CXR: No acute CP disease     Interventions in the ER: 1L LR, Insulin + Dextro, Albuterol, Solu-cortef 100, Admit to tele  (08 Apr 2023 16:17)      Cardiology Fellow Notes    Previous Cardiac Workup    Risk Factors:      PAST MEDICAL & SURGICAL HISTORY  DM (diabetes mellitus)    HTN (hypertension)    HLD (hyperlipidemia)    CVA (cerebrovascular accident)    Status post cataract extraction and insertion of intraocular lens of left eye        FAMILY HISTORY:  FAMILY HISTORY:      SOCIAL HISTORY:  Social History:  , Manager, (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 1000 milliLiter(s) (50 mL/Hr) IV Continuous <Continuous>  dextrose 5%. 1000 milliLiter(s) (100 mL/Hr) IV Continuous <Continuous>  dextrose 50% Injectable 25 Gram(s) IV Push once  dextrose 50% Injectable 12.5 Gram(s) IV Push once  dextrose 50% Injectable 25 Gram(s) IV Push once  fenofibrate Tablet 145 milliGRAM(s) Oral daily  glucagon  Injectable 1 milliGRAM(s) IntraMuscular once  heparin   Injectable 5000 Unit(s) SubCutaneous every 8 hours  insulin glargine Injectable (LANTUS) 15 Unit(s) SubCutaneous at bedtime  insulin lispro (ADMELOG) corrective regimen sliding scale   SubCutaneous three times a day before meals  insulin lispro Injectable (ADMELOG) 5 Unit(s) SubCutaneous three times a day before meals    PRN:  dextrose Oral Gel 15 Gram(s) Oral once PRN      HOME MEDICATIONS:  Home Medications:  Aspir 81 oral delayed release tablet: 1 tab(s) orally once a day (08 Apr 2023 13:38)  atorvastatin 20 mg oral tablet: 1 tab(s) orally once a day (08 Apr 2023 13:38)  fenofibrate 145 mg oral tablet: 1 tab(s) orally once a day (08 Apr 2023 13:38)  labetalol 100 mg oral tablet: 1 tab(s) orally 2 times a day (08 Apr 2023 13:38)  metFORMIN 1000 mg oral tablet: 1 tab(s) orally 2 times a day (08 Apr 2023 13:38)  NIFEdipine 60 mg oral tablet, extended release: 1 tab(s) orally once a day (08 Apr 2023 13:38)  telmisartan-hydrochlorothiazide 80 mg-25 mg oral tablet: 1 tab(s) orally once a day (08 Apr 2023 13:38)  Vitamin B12 50 mcg oral tablet: 1 tab(s) orally once a day (08 Apr 2023 13:38)      VITALS:   T(F): 96.8 (04-08 @ 16:30), Max: 96.8 (04-08 @ 16:30)  HR: 59 (04-08 @ 16:30) (45 - 59)  BP: 123/63 (04-08 @ 16:30) (85/55 - 123/63)  BP(mean): --  RR: 20 (04-08 @ 16:30) (18 - 20)  SpO2: 100% (04-08 @ 16:30) (98% - 100%)    I&O's Summary      REVIEW OF SYSTEMS:  CONSTITUTIONAL: No weakness, fevers or chills  HEENT: No visual changes, neck/ear pain  RESPIRATORY: No cough, sob  CARDIOVASCULAR: See HPI  GASTROINTESTINAL: No abdominal pain. No nausea, vomiting, diarrhea   GENITOURINARY: No dysuria, frequency or hematuria  NEUROLOGICAL: No new focal deficits  SKIN: No new rashes    PHYSICAL EXAM:  General: Not in distress.  Non-toxic appearing.   HEENT: EOMI  Cardio: regular, S1, S2, no murmur  Pulm: B/L BS.  No wheezing / crackles / rales  Abdomen: Soft, non-tender, non-distended. Normoactive bowel sounds  Extremities: No edema b/l le  Neuro: A&O x3. No focal deficits    LABS:                        11.8   8.96  )-----------( 316      ( 08 Apr 2023 13:02 )             36.7     04-08    137  |  104  |  43<H>  ----------------------------<  288<H>  5.7<H>   |  23  |  2.0<H>    Ca    10.2      08 Apr 2023 13:02  Mg     1.9     04-08    TPro  7.0  /  Alb  4.4  /  TBili  0.3  /  DBili  x   /  AST  14  /  ALT  <5  /  AlkPhos  42  04-08    PT/INR - ( 08 Apr 2023 13:02 )   PT: 12.20 sec;   INR: 1.07 ratio         PTT - ( 08 Apr 2023 13:02 )  PTT:34.9 sec  Troponin T, Serum: <0.01 ng/mL (04-08-23 @ 13:02)    CARDIAC MARKERS ( 08 Apr 2023 13:02 )  x     / <0.01 ng/mL / x     / x     / x            Troponin trend:        COVID-19 PCR: NotDetec (08 Apr 2023 13:02)      IMAGING:  -CXR:  -TTE:  -CCTA:  -STRESS TEST:  -CATHETERIZATION:    ECG:      TELEMETRY EVENTS:   Outpt cardiologist: Dr. Edwards    Reason for Consult:   New Onset Sinus Zachery   Suspect Medication BB misuse vs hypothyroidism    HISTORY OF PRESENT ILLNESS:  HPI: 69 y/o male with past medical history of NIDDM, HTN, DLD, remote history of CVA, and chronic kidney disease not on dialysis presents to the emergency department with near syncopal episode that occurred at home. Patient describes feeling lightheaded and dizzy as he stood up from seated position. Patient currently takes labetalol 50 mg twice a day and nifedipine 60 mg once a day. Patient took his medications this morning and does not believe that he overdosed. Patient still does not feel well in the emergency department and feels extremely fatigued. Denies LOC head injury anticoagulation use recent fevers chills chest pain difficulty breathing nausea vomiting abdominal pain or any known history of thyroid disease or any other complaints. Patient reports chronic symptoms of constipation, cold intolerance, and fatigue but says that he had a TSH done 3 weeks ago that was normal.     In the ER:   VS: BP 85/55, HR 45, T 35.9 oral, SpO2 98% RA  EKG: Sinus Zachery + PACs  Sig Labs: Hgb 11.8, INR 1.07, K 5.7, Cr 2.0 (BL 1.6), Trop -ve  [place] Heart: Normal EF, No pericardial effusion   CXR: No acute CP disease     Interventions in the ER: 1L LR, Insulin + Dextro, Albuterol, Solu-cortef 100, Admit to tele  (08 Apr 2023 16:17)      Cardiology Fellow Notes          PAST MEDICAL & SURGICAL HISTORY  DM (diabetes mellitus)    HTN (hypertension)    HLD (hyperlipidemia)    CVA (cerebrovascular accident)    Status post cataract extraction and insertion of intraocular lens of left eye        FAMILY HISTORY:  FAMILY HISTORY:      SOCIAL HISTORY:  Social History:  , Manager, (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 1000 milliLiter(s) (50 mL/Hr) IV Continuous <Continuous>  dextrose 5%. 1000 milliLiter(s) (100 mL/Hr) IV Continuous <Continuous>  dextrose 50% Injectable 25 Gram(s) IV Push once  dextrose 50% Injectable 12.5 Gram(s) IV Push once  dextrose 50% Injectable 25 Gram(s) IV Push once  fenofibrate Tablet 145 milliGRAM(s) Oral daily  glucagon  Injectable 1 milliGRAM(s) IntraMuscular once  heparin   Injectable 5000 Unit(s) SubCutaneous every 8 hours  insulin glargine Injectable (LANTUS) 15 Unit(s) SubCutaneous at bedtime  insulin lispro (ADMELOG) corrective regimen sliding scale   SubCutaneous three times a day before meals  insulin lispro Injectable (ADMELOG) 5 Unit(s) SubCutaneous three times a day before meals    PRN:  dextrose Oral Gel 15 Gram(s) Oral once PRN      HOME MEDICATIONS:  Home Medications:  Aspir 81 oral delayed release tablet: 1 tab(s) orally once a day (08 Apr 2023 13:38)  atorvastatin 20 mg oral tablet: 1 tab(s) orally once a day (08 Apr 2023 13:38)  fenofibrate 145 mg oral tablet: 1 tab(s) orally once a day (08 Apr 2023 13:38)  labetalol 100 mg oral tablet: 1 tab(s) orally 2 times a day (08 Apr 2023 13:38)  metFORMIN 1000 mg oral tablet: 1 tab(s) orally 2 times a day (08 Apr 2023 13:38)  NIFEdipine 60 mg oral tablet, extended release: 1 tab(s) orally once a day (08 Apr 2023 13:38)  telmisartan-hydrochlorothiazide 80 mg-25 mg oral tablet: 1 tab(s) orally once a day (08 Apr 2023 13:38)  Vitamin B12 50 mcg oral tablet: 1 tab(s) orally once a day (08 Apr 2023 13:38)      VITALS:   T(F): 96.8 (04-08 @ 16:30), Max: 96.8 (04-08 @ 16:30)  HR: 59 (04-08 @ 16:30) (45 - 59)  BP: 123/63 (04-08 @ 16:30) (85/55 - 123/63)  BP(mean): --  RR: 20 (04-08 @ 16:30) (18 - 20)  SpO2: 100% (04-08 @ 16:30) (98% - 100%)    I&O's Summary      REVIEW OF SYSTEMS:  CONSTITUTIONAL: No weakness, fevers or chills  HEENT: No visual changes, neck/ear pain  RESPIRATORY: No cough, sob  CARDIOVASCULAR: See HPI  GASTROINTESTINAL: No abdominal pain. No nausea, vomiting, diarrhea   GENITOURINARY: No dysuria, frequency or hematuria  NEUROLOGICAL: No new focal deficits  SKIN: No new rashes    PHYSICAL EXAM:  General: Not in distress.  Non-toxic appearing.   HEENT: EOMI  Cardio: regular, S1, S2, no murmur  Pulm: B/L BS.  No wheezing / crackles / rales  Abdomen: Soft, non-tender, non-distended. Normoactive bowel sounds  Extremities: No edema b/l le  Neuro: A&O x3. No focal deficits    LABS:                        11.8   8.96  )-----------( 316      ( 08 Apr 2023 13:02 )             36.7     04-08    137  |  104  |  43<H>  ----------------------------<  288<H>  5.7<H>   |  23  |  2.0<H>    Ca    10.2      08 Apr 2023 13:02  Mg     1.9     04-08    TPro  7.0  /  Alb  4.4  /  TBili  0.3  /  DBili  x   /  AST  14  /  ALT  <5  /  AlkPhos  42  04-08    PT/INR - ( 08 Apr 2023 13:02 )   PT: 12.20 sec;   INR: 1.07 ratio         PTT - ( 08 Apr 2023 13:02 )  PTT:34.9 sec  Troponin T, Serum: <0.01 ng/mL (04-08-23 @ 13:02)    CARDIAC MARKERS ( 08 Apr 2023 13:02 )  x     / <0.01 ng/mL / x     / x     / x            Troponin trend:        COVID-19 PCR: NotDetec (08 Apr 2023 13:02)      IMAGING:  -CXR:  -TTE:  -CCTA:  -STRESS TEST:  -CATHETERIZATION:    ECG:      TELEMETRY EVENTS:

## 2023-04-08 NOTE — ED PROVIDER NOTE - PHYSICAL EXAMINATION
VITAL SIGNS: I have reviewed nursing notes and confirm.  CONSTITUTIONAL: non-toxic, NAD  SKIN: no rash, no petechiae. + pallor   EYES: PERRL, EOMI, + pale conjunctiva, anicteric  ENT: tongue midline, no exudates, MMM  NECK: Supple; no meningismus, no JVD  CARD: + bradycardia, no murmurs, equal radial pulses bilaterally 2+  RESP: CTAB, no respiratory distress  ABD: Soft, non-tender, non-distended  EXT: No edema. No calves tenderness  NEURO: Alert, orientedx3, equal strength bilaterally, sensation intact, no slurred speech   PSYCH: Cooperative, appropriate.

## 2023-04-08 NOTE — ED ADULT NURSE NOTE - OBJECTIVE STATEMENT
pt had near syncopal episode today while seated. found to be hypotensive and bradycardic. pt pale, c/o feeling "cold" c/o chest pain earlier but not currently. compliant with medications, did not take extra dose of labetalol.

## 2023-04-08 NOTE — ED ADULT TRIAGE NOTE - CHIEF COMPLAINT QUOTE
pt reports feeling lightheaded and weak with CP and almost passed out while seated. pt BP at home following near-syncopal episode was 65/35. pt BP 85/55 HR 45  in triage with persistent CP, dizziness and weakness.

## 2023-04-08 NOTE — ED PROVIDER NOTE - RATE
Panel Management Review      Patient has the following on his problem list:     Asthma review   No flowsheet data found.   1. Is Asthma diagnosis on the Problem List? Yes    2. Is Asthma listed on Health Maintenance? Yes    3. Patient is due for:  ACT and AAP    Hypertension   Last three blood pressure readings:  BP Readings from Last 3 Encounters:   08/14/17 172/80   08/13/17 164/88   08/10/17 148/72     Blood pressure: FAILED    HTN Guidelines:  Age 18-59 BP range:  Less than 140/90  Age 60-85 with Diabetes:  Less than 140/90  Age 60-85 without Diabetes:  less than 150/90      Composite cancer screening  Chart review shows that this patient is due/due soon for the following Colonoscopy  Summary:    Patient is due/failing the following:   AAP, ACT, BP CHECK and COLONOSCOPY    Action needed:   Patient needs office visit for see above.    Type of outreach:    Sent [email or web address] message. sent letter pt does not use [email or web address]      Questions for provider review:    None                                                                                                                                    .[email or web address] [email or web address] LPN       Chart routed to nonw .           [phone]

## 2023-04-08 NOTE — H&P ADULT - ATTENDING COMMENTS
67 y/o male with past medical history of NIDDM, HTN, DLD, remote history of CVA, and chronic kidney disease not on dialysis presents to the emergency department with near syncopal episode that occurred at home. Patient describes feeling lightheaded and dizzy as he stood up from seated position. Patient currently takes labetalol 50 mg twice a day and nifedipine 60 mg once a day. Patient took his medications this morning and does not believe that he overdosed. Patient still does not feel well in the emergency department and feels extremely fatigued. Denies LOC head injury anticoagulation use recent fevers chills chest pain difficulty breathing nausea vomiting abdominal pain or any known history of thyroid disease or any other complaints. Patient reports chronic symptoms of constipation, cold intolerance, and fatigue but says that he had a TSH done 3 weeks ago that was normal.     In the ER: he was hypotensive and bradycardic, but now his bp and hr have improved     Interventions in the ER: 1L LR, Insulin + Dextro, Albuterol, Solu-cortef 100, Admit to tele  (08 Apr 2023 16:17)    now he is feeling much better  T(F): 96.8 (04-08-23 @ 16:30), Max: 96.8 (04-08-23 @ 16:30)  HR: 59 (04-08-23 @ 16:30) (45 - 59)  BP: 123/63 (04-08-23 @ 16:30) (85/55 - 123/63)  RR: 20 (04-08-23 @ 16:30) (18 - 20)  SpO2: 100% (04-08-23 @ 16:30) (98% - 100%)    Physical exam:   constitutional NAD, AAOX3, Respiratory  lungs CTA, CVS heart RRR, GI: abdomen Soft NT, ND, BS+, skin: intact  neuro exam Motor, sensory and CN normal, no deficit                         11.8   8.96  )-----------( 316      ( 08 Apr 2023 13:02 )             36.7   04-08    137  |  104  |  43<H>  ----------------------------<  288<H>  5.7<H>   |  23  |  2.0<H>    Ca    10.2      08 Apr 2023 13:02  Mg     1.9     04-08    TPro  7.0  /  Alb  4.4  /  TBili  0.3  /  DBili  x   /  AST  14  /  ALT  <5  /  AlkPhos  42  04-08    creatinine trend  2.0 (04-08-23 @ 13:02)   1.6 mg/dL (11.17.22 @ 04:30)    a/p  # bradycardia and hypotension , probably due to meds, in setting of ckd   hold bp meds, dc bb for now   cardio and ep eval 69 y/o male with past medical history of NIDDM, HTN, DLD, remote history of CVA, and chronic kidney disease not on dialysis presents to the emergency department with near syncopal episode that occurred at home. Patient describes feeling lightheaded and dizzy as he stood up from seated position. Patient currently takes labetalol 50 mg twice a day and nifedipine 60 mg once a day. Patient took his medications this morning and does not believe that he overdosed. Patient still does not feel well in the emergency department and feels extremely fatigued. Denies LOC head injury anticoagulation use recent fevers chills chest pain difficulty breathing nausea vomiting abdominal pain or any known history of thyroid disease or any other complaints. Patient reports chronic symptoms of constipation, cold intolerance, and fatigue but says that he had a TSH done 3 weeks ago that was normal.     In the ER: he was hypotensive and bradycardic, but now his bp and hr have improved     Interventions in the ER: 1L LR, Insulin + Dextro, Albuterol, Solu-cortef 100, Admit to tele  (08 Apr 2023 16:17)    now he is feeling much better  T(F): 96.8 (04-08-23 @ 16:30), Max: 96.8 (04-08-23 @ 16:30)  HR: 59 (04-08-23 @ 16:30) (45 - 59)  BP: 123/63 (04-08-23 @ 16:30) (85/55 - 123/63)  RR: 20 (04-08-23 @ 16:30) (18 - 20)  SpO2: 100% (04-08-23 @ 16:30) (98% - 100%)    Physical exam:   constitutional NAD, AAOX3, Respiratory  lungs CTA, CVS heart RRR, GI: abdomen Soft NT, ND, BS+, skin: intact  neuro exam Motor, sensory and CN normal, no deficit                         11.8   8.96  )-----------( 316      ( 08 Apr 2023 13:02 )             36.7   04-08    137  |  104  |  43<H>  ----------------------------<  288<H>  5.7<H>   |  23  |  2.0<H>    Ca    10.2      08 Apr 2023 13:02  Mg     1.9     04-08    TPro  7.0  /  Alb  4.4  /  TBili  0.3  /  DBili  x   /  AST  14  /  ALT  <5  /  AlkPhos  42  04-08    creatinine trend  2.0 (04-08-23 @ 13:02)   1.6 mg/dL (11.17.22 @ 04:30)    ecg: sinus nadiya at 44,     a/p  # bradycardia and hypotension , probably due to meds, in setting of ckd   hold bp meds, dc bb for now   cardio and ep eval    # ckd , worsening, possibly due to diabetic and hypertensive nephropathy, check bladder and kidney us, repeat labs after hydration     # DM monitor fs, cont insulin per protocol   # HTN , holding meds due to low bp   # hyperlipidemia, cont meds     #Progress Note Handoff  Pending (specify): repeat ecg in am   Family discussion: blake pt and his son at the bedside.   Disposition: Home_

## 2023-04-08 NOTE — H&P ADULT - NSHPLABSRESULTS_GEN_ALL_CORE
11.8   8.96  )-----------( 316      ( 08 Apr 2023 13:02 )             36.7     04-08    137  |  104  |  43<H>  ----------------------------<  288<H>  5.7<H>   |  23  |  2.0<H>    Ca    10.2      08 Apr 2023 13:02  Mg     1.9     04-08    TPro  7.0  /  Alb  4.4  /  TBili  0.3  /  DBili  x   /  AST  14  /  ALT  <5  /  AlkPhos  42  04-08    PT/INR - ( 08 Apr 2023 13:02 )   PT: 12.20 sec;   INR: 1.07 ratio         PTT - ( 08 Apr 2023 13:02 )  PTT:34.9 sec      Troponin T, Serum: <0.01 ng/mL (04-08-23 @ 13:02)          CARDIAC MARKERS ( 08 Apr 2023 13:02 )  x     / <0.01 ng/mL / x     / x     / x 11.8   8.96  )-----------( 316      ( 08 Apr 2023 13:02 )              36.7     04-08    137  |  104  |  43<H>  ----------------------------<  288<H>  5.7<H>   |  23  |  2.0<H>    Ca    10.2      08 Apr 2023 13:02  Mg     1.9     04-08    TPro  7.0  /  Alb  4.4  /  TBili  0.3  /  DBili  x   /  AST  14  /  ALT  <5  /  AlkPhos  42  04-08    PT/INR - ( 08 Apr 2023 13:02 )   PT: 12.20 sec;   INR: 1.07 ratio       PTT - ( 08 Apr 2023 13:02 )  PTT:34.9 sec    Troponin T, Serum: <0.01 ng/mL (04-08-23 @ 13:02)    CARDIAC MARKERS ( 08 Apr 2023 13:02 )  x     / <0.01 ng/mL / x     / x     / x

## 2023-04-08 NOTE — ED PROVIDER NOTE - DIFFERENTIAL DIAGNOSIS
The differential diagnosis for patients clinical presentation includes but is not limited to:  Hypothyroidism, Adrenal Insufficiency, Sepsis, CANDICE Differential Diagnosis

## 2023-04-08 NOTE — H&P ADULT - HISTORY OF PRESENT ILLNESS
HPI: 69 y/o male with past medical history of NIDDM, HTN, DLD, remote history of CVA, and chronic kidney disease not on dialysis presents to the emergency department with near syncopal episode that occurred at home. Patient describes feeling lightheaded and dizzy as he stood up from seated position. Patient currently takes labetalol 50 mg twice a day and nifedipine 60 mg once a day. Patient took his medications this morning and does not believe that he overdosed. Patient still does not feel well in the emergency department and feels extremely fatigued. Denies LOC head injury anticoagulation use recent fevers chills chest pain difficulty breathing nausea vomiting abdominal pain or any known history of thyroid disease or any other complaints. Patient reports chronic symptoms of constipation, cold intolerance, and fatigue but says that he had a TSH done 3 weeks ago that was normal.     In the ER:   VS: BP 85/55, HR 45, T 35.9 oral, SpO2 98% RA  EKG: Sinus Zachery + PACs  Sig Labs: Hgb 11.8, INR 1.07, K 5.7, Cr 2.0 (BL 1.6), Trop -ve  [place]ocus Heart: Normal EF, No pericardial effusion   CXR: No acute CP disease     Interventions in the ER: 1L LR, Insulin + Dextro, Albuterol, Solu-cortef 100, Admit to tele

## 2023-04-08 NOTE — ED PROVIDER NOTE - ATTENDING CONTRIBUTION TO CARE
I personally evaluated patient. I agree with the findings and plan with all documentation on chart except as documented  in my note.    67 y/o M PMHX DM, HTN, DLD, remote history of CVA, and chronic kidney disease not on dialysis presents to the emergency department with near syncopal episode that occurred at home. Patient describes feeling lightheaded and dizzy as he stood up from seated position. Patient currently takes labetalol 50 mg twice a day and nifedipine 60 mg once a day. Patient took his medications this morning and did not take extra medication. No fever or signs of infectious etiology. No history of DVT or PE.    Patient extremely bradycardic in triage and was brought to the Critical Care ED. HR 40's, BP 85/55, patient afebrile. EKG done and shows Sinus Bradycardia. IV placed and labs sent. CXR done and negative. Bedside echo done and shows normal EF with bradycardia. Hydrocortisone dose given for possible adrenal insufficiency and thyroid testing sent. Patient found to have mild CANDICE and K+ was 5.7. Patient given insulin and albuterol. Patient remained hemodynamically stable and HR improving in the ED.    ED work up reviewed and results and plan of care discussed with patient. Patient requires admission for further work up, monitoring, and management. Need for admission discussed with patient.

## 2023-04-08 NOTE — ED ADULT TRIAGE NOTE - PATIENT ON (OXYGEN DELIVERY METHOD)
COVID-19 Screening:    • Does the patient OR patient’s household members have any of the following symptoms?  o Temperature: Fever ?100.0°F or ?37.8°C?  No  o Respiratory symptoms: New or worsening cough, shortness of breath, difficulty breathing, or sore throat? No  o GI symptoms: New onset of nausea, vomiting or diarrhea?  No  o Miscellaneous: New onset of loss of taste or smell, chills, repeated shaking with chills, muscle pain, headache, congestion or runny nose?  No  • Has the patient or a household member tested positive for COVID-19 in the last 14 days?  No  • Has the patient or a household member been tested for COVID-19 and are waiting for the results?  No     room air

## 2023-04-08 NOTE — ED PROVIDER NOTE - CLINICAL SUMMARY MEDICAL DECISION MAKING FREE TEXT BOX
67 y/o M PMHX DM, HTN, DLD, remote history of CVA, and chronic kidney disease not on dialysis presents to the emergency department with near syncopal episode that occurred at home. Patient describes feeling lightheaded and dizzy as he stood up from seated position. Patient currently takes labetalol 50 mg twice a day and nifedipine 60 mg once a day. Patient took his medications this morning and did not take extra medication. No fever or signs of infectious etiology. No history of DVT or PE.    Patient extremely bradycardic in triage and was brought to the Critical Care ED. HR 40's, BP 85/55, patient afebrile. EKG done and shows Sinus Bradycardia. IV placed and labs sent. CXR done and negative. Bedside echo done and shows normal EF with bradycardia. Hydrocortisone dose given for possible adrenal insufficiency and thyroid testing sent. Patient found to have mild CANDICE and K+ was 5.7. Patient given insulin and albuterol. Patient remained hemodynamically stable and HR improving in the ED.    ED work up reviewed and results and plan of care discussed with patient. Patient requires admission for further work up, monitoring, and management. Need for admission discussed with patient.

## 2023-04-08 NOTE — H&P ADULT - ASSESSMENT
HPI: 67 y/o male with past medical history of NIDDM, HTN, DLD, remote history of CVA, and chronic kidney disease not on dialysis presents to the emergency department with near syncopal episode that occurred at home. Patient describes feeling lightheaded and dizzy as he stood up from seated position. Patient currently takes labetalol 50 mg twice a day and nifedipine 60 mg once a day. Patient took his medications this morning and does not believe that he overdosed. Patient still does not feel well in the emergency department and feels extremely fatigued. Denies LOC head injury anticoagulation use recent fevers chills chest pain difficulty breathing nausea vomiting abdominal pain or any known history of thyroid disease or any other complaints. Patient reports chronic symptoms of constipation, cold intolerance, and fatigue but says that he had a TSH done 3 weeks ago that was normal.     In the ER:   VS: BP 85/55, HR 45, T 35.9 oral, SpO2 98% RA  EKG: Sinus Zachery + PACs  Sig Labs: Hgb 11.8, INR 1.07, K 5.7, Cr 2.0 (BL 1.6), Trop -ve  Pocus Heart: Normal EF, No pericardial effusion   CXR: No acute CP disease     Interventions in the ER: 1L LR, Insulin + Dextro, Albuterol, Solu-cortef 100, Admit to tele     # Presyncope  # Suspected Medication Overuse Vs. Hypothyroidism   - Patient Hypotensive and bradycardic on admission   - EKG Sinus Zachery + PACs  - POCUS Heart Normal EF, No Pericardial effusion   - CXR No acute CP disease   - Patient takes Labetalol 50 BID (and says it is unlikely he took more than he should)  - Reports that he had a TSH performed 3 weeks ago was normal   - Trop -ve on admission (follow up repeat)   - Tele Monitor, Admit to tele   - Cardiology Consult (Dr. Edwards), EP Consult    - Repeat TSH, AM Cortisol, Lyme Serology   - EKG in AM  - Echo   - Hold Home Labetalol 50 BID for now   - Fall precautions, and PT Consult       # NIDDM  - Patient home metformin 1000 BID and Ozempic held   - DASH/TLC CC Diet  - A1C in AM  - 15/5x3 + SS insulin     # HTN  # DLD  # Remote history of CVA  - Lipid Profile in AM   - C/w atorvastatin 20, finofibrate 145, Telmisartan 80 (converted to losartan 100), HCTZ 25, Nifedipine 60, Labetalol 50 BID held   - DASH/TLC CC Diet     # Likely Prerenal CANDICE on CKDIII  -  Cr 2.0 (BL 1.6)  - S/P 1 L LR, Trend     # DVT prophylaxis - Hep 5000 TID  # GI prophylaxis - Not Indicated   # Diet - DASH/TLC CC  # Activity Score (AM-PAC) - IAT, PT Consult   # Code status - Full   # Disposition - From Home    HPI: 69 y/o male with past medical history of NIDDM, HTN, DLD, remote history of CVA, and chronic kidney disease not on dialysis presents to the emergency department with near syncopal episode that occurred at home. Patient describes feeling lightheaded and dizzy as he stood up from seated position. Patient currently takes labetalol 50 mg twice a day and nifedipine 60 mg once a day. Patient took his medications this morning and does not believe that he overdosed. Patient still does not feel well in the emergency department and feels extremely fatigued. Denies LOC head injury anticoagulation use recent fevers chills chest pain difficulty breathing nausea vomiting abdominal pain or any known history of thyroid disease or any other complaints. Patient reports chronic symptoms of constipation, cold intolerance, and fatigue but says that he had a TSH done 3 weeks ago that was normal.     In the ER:   VS: BP 85/55, HR 45, T 35.9 oral, SpO2 98% RA  EKG: Sinus Zachery + PACs  Sig Labs: Hgb 11.8, INR 1.07, K 5.7, Cr 2.0 (BL 1.6), Trop -ve  [place] Heart: Normal EF, No pericardial effusion   CXR: No acute CP disease     Interventions in the ER: 1L LR, Insulin + Dextro, Albuterol, Solu-cortef 100, Admit to tele     # Presyncope  # Suspected Medication Overuse Vs. Hypothyroidism   - Patient Hypotensive and bradycardic on admission   - EKG Sinus Zachery + PACs  - POCUS Heart Normal EF, No Pericardial effusion   - CXR No acute CP disease   - Patient takes Labetalol 50 BID (and says it is unlikely he took more than he should)  - Reports that he had a TSH performed 3 weeks ago was normal   - Trop -ve on admission (follow up repeat)   - Tele Monitor, Admit to tele   - Cardiology Consult (Dr. Edwards), EP Consult    - Repeat TSH, AM Cortisol, Lyme Serology   - EKG in AM  - Echo   - Hold Home Labetalol 50 BID for now   - Fall precautions, and PT Consult     # NIDDM  - Patient home metformin 1000 BID and Ozempic held   - DASH/TLC CC Diet  - A1C in AM  - 15/5x3 + SS insulin     # HTN  # DLD  # Remote history of CVA  - Lipid Profile in AM   - C/w atorvastatin 20, finofibrate 145, HCTZ 25, Nifedipine 60, Labetalol 50 BID held   - DASH/TLC CC Diet     # Likely Prerenal CANDICE on CKDIII  # Hyperkalemia   - Cr 2.0 (BL 1.6), K 5.8   - Hyperkalemia treatment given, BMP at 8 pm   - Home Telmisartan 80 (dose equivalent to losartan 100) held in setting of CANDICE  - S/P 1 L LR, Trend     # DVT prophylaxis - Hep 5000 TID  # GI prophylaxis - Not Indicated   # Diet - DASH/TLC CC  # Activity Score (AM-PAC) - IAT, PT Consult   # Code status - Full   # Disposition - From Home

## 2023-04-08 NOTE — H&P ADULT - NSHPPHYSICALEXAM_GEN_ALL_CORE
CONSTITUTIONAL: non-toxic, NAD  SKIN: no rash, no petechiae. + pallor   EYES: PERRL, EOMI, + pale conjunctiva, anicteric  ENT: tongue midline, no exudates, MMM  NECK: Supple; no meningismus, no JVD  CARD: + bradycardia, no murmurs, equal radial pulses bilaterally 2+  RESP: CTAB, no respiratory distress  ABD: Soft, non-tender, non-distended  EXT: No edema. No calves tenderness  NEURO: Alert, orientedx3, equal strength bilaterally, sensation intact, no slurred speech   PSYCH: Cooperative, appropriate.

## 2023-04-08 NOTE — ED PROVIDER NOTE - NS ED MD TWO NIGHTS YN
[FreeTextEntry1] : cough x 2 months\par no fever, chill, chest pain\par Breo daily, ran out 1 months\par sputum clear\par has sig reflux\par no fever, chills, chest pain\par Cardiac work up negative\par  Yes

## 2023-04-08 NOTE — ED PROVIDER NOTE - CARE PLAN
1 Principal Discharge DX:	Sinus bradycardia  Secondary Diagnosis:	Near syncope  Secondary Diagnosis:	Hyperkalemia

## 2023-04-09 LAB
ALBUMIN SERPL ELPH-MCNC: 3.9 G/DL — SIGNIFICANT CHANGE UP (ref 3.5–5.2)
ALP SERPL-CCNC: 25 U/L — LOW (ref 30–115)
ALT FLD-CCNC: 6 U/L — SIGNIFICANT CHANGE UP (ref 0–41)
ANION GAP SERPL CALC-SCNC: 12 MMOL/L — SIGNIFICANT CHANGE UP (ref 7–14)
AST SERPL-CCNC: 13 U/L — SIGNIFICANT CHANGE UP (ref 0–41)
BILIRUB SERPL-MCNC: 0.3 MG/DL — SIGNIFICANT CHANGE UP (ref 0.2–1.2)
BUN SERPL-MCNC: 36 MG/DL — HIGH (ref 10–20)
CALCIUM SERPL-MCNC: 10 MG/DL — SIGNIFICANT CHANGE UP (ref 8.4–10.5)
CHLORIDE SERPL-SCNC: 107 MMOL/L — SIGNIFICANT CHANGE UP (ref 98–110)
CHOLEST SERPL-MCNC: 124 MG/DL — SIGNIFICANT CHANGE UP
CO2 SERPL-SCNC: 24 MMOL/L — SIGNIFICANT CHANGE UP (ref 17–32)
CREAT SERPL-MCNC: 1.4 MG/DL — SIGNIFICANT CHANGE UP (ref 0.7–1.5)
EGFR: 55 ML/MIN/1.73M2 — LOW
GLUCOSE BLDC GLUCOMTR-MCNC: 116 MG/DL — HIGH (ref 70–99)
GLUCOSE BLDC GLUCOMTR-MCNC: 117 MG/DL — HIGH (ref 70–99)
GLUCOSE BLDC GLUCOMTR-MCNC: 121 MG/DL — HIGH (ref 70–99)
GLUCOSE BLDC GLUCOMTR-MCNC: 160 MG/DL — HIGH (ref 70–99)
GLUCOSE BLDC GLUCOMTR-MCNC: 80 MG/DL — SIGNIFICANT CHANGE UP (ref 70–99)
GLUCOSE BLDC GLUCOMTR-MCNC: 89 MG/DL — SIGNIFICANT CHANGE UP (ref 70–99)
GLUCOSE SERPL-MCNC: 106 MG/DL — HIGH (ref 70–99)
HCT VFR BLD CALC: 30.8 % — LOW (ref 42–52)
HDLC SERPL-MCNC: 46 MG/DL — SIGNIFICANT CHANGE UP
HGB BLD-MCNC: 10.2 G/DL — LOW (ref 14–18)
LIPID PNL WITH DIRECT LDL SERPL: 52 MG/DL — SIGNIFICANT CHANGE UP
MAGNESIUM SERPL-MCNC: 1.9 MG/DL — SIGNIFICANT CHANGE UP (ref 1.8–2.4)
MCHC RBC-ENTMCNC: 27.6 PG — SIGNIFICANT CHANGE UP (ref 27–31)
MCHC RBC-ENTMCNC: 33.1 G/DL — SIGNIFICANT CHANGE UP (ref 32–37)
MCV RBC AUTO: 83.2 FL — SIGNIFICANT CHANGE UP (ref 80–94)
NON HDL CHOLESTEROL: 78 MG/DL — SIGNIFICANT CHANGE UP
NRBC # BLD: 0 /100 WBCS — SIGNIFICANT CHANGE UP (ref 0–0)
PLATELET # BLD AUTO: 283 K/UL — SIGNIFICANT CHANGE UP (ref 130–400)
POTASSIUM SERPL-MCNC: 3.8 MMOL/L — SIGNIFICANT CHANGE UP (ref 3.5–5)
POTASSIUM SERPL-SCNC: 3.8 MMOL/L — SIGNIFICANT CHANGE UP (ref 3.5–5)
PROT SERPL-MCNC: 6.2 G/DL — SIGNIFICANT CHANGE UP (ref 6–8)
RBC # BLD: 3.7 M/UL — LOW (ref 4.7–6.1)
RBC # FLD: 14.3 % — SIGNIFICANT CHANGE UP (ref 11.5–14.5)
SODIUM SERPL-SCNC: 143 MMOL/L — SIGNIFICANT CHANGE UP (ref 135–146)
TRIGL SERPL-MCNC: 129 MG/DL — SIGNIFICANT CHANGE UP
WBC # BLD: 7.22 K/UL — SIGNIFICANT CHANGE UP (ref 4.8–10.8)
WBC # FLD AUTO: 7.22 K/UL — SIGNIFICANT CHANGE UP (ref 4.8–10.8)

## 2023-04-09 PROCEDURE — 93010 ELECTROCARDIOGRAM REPORT: CPT

## 2023-04-09 PROCEDURE — 99223 1ST HOSP IP/OBS HIGH 75: CPT | Mod: 57

## 2023-04-09 PROCEDURE — 99233 SBSQ HOSP IP/OBS HIGH 50: CPT

## 2023-04-09 PROCEDURE — 99222 1ST HOSP IP/OBS MODERATE 55: CPT

## 2023-04-09 PROCEDURE — 93306 TTE W/DOPPLER COMPLETE: CPT | Mod: 26

## 2023-04-09 RX ADMIN — HEPARIN SODIUM 5000 UNIT(S): 5000 INJECTION INTRAVENOUS; SUBCUTANEOUS at 23:02

## 2023-04-09 RX ADMIN — HEPARIN SODIUM 5000 UNIT(S): 5000 INJECTION INTRAVENOUS; SUBCUTANEOUS at 06:03

## 2023-04-09 RX ADMIN — Medication 5 UNIT(S): at 13:50

## 2023-04-09 RX ADMIN — ATORVASTATIN CALCIUM 20 MILLIGRAM(S): 80 TABLET, FILM COATED ORAL at 23:01

## 2023-04-09 RX ADMIN — Medication 145 MILLIGRAM(S): at 11:45

## 2023-04-09 RX ADMIN — HEPARIN SODIUM 5000 UNIT(S): 5000 INJECTION INTRAVENOUS; SUBCUTANEOUS at 14:33

## 2023-04-09 NOTE — PROGRESS NOTE ADULT - ASSESSMENT
HPI: 67 y/o male with past medical history of NIDDM, HTN, DLD, remote history of CVA, and chronic kidney disease not on dialysis presents to the emergency department with near syncopal episode that occurred at home.    # Presyncope 2/2 Suspected Medication (Beta-Blocker therapy)   Vs. Hypothyroidism vs arrythmia   - Patient Hypotensive and bradycardic on admission   - EKG Sinus Zachery + PACs  - POCUS Heart Normal EF, No Pericardial effusion   - CXR No acute CP disease   - Patient takes Labetalol 50 BID (and says it is unlikely he took more than he should), pt states that he has been taking it for years   - Reports that he had a TSH performed 3 weeks ago was normal   - Trop -ve x2   - Tele Monitor, Admit to tele   - Cardiology Consult (Dr. Edwards), EP Consult    - follow up TSH  - Echo   - Hold Home Labetalol 50 BID for now   - Fall precautions, and PT Consult   - EP consulted, recc loop recorder 4/10    # NIDDM  - Patient home metformin 1000 BID and Ozempic held   - DASH/TLC CC Diet  - A1C in AM  - 15/5x3 + SS insulin     # HTN  # DLD  # Remote history of CVA  - Lipid Profile in AM   - C/w atorvastatin 20, finofibrate 145, HCTZ 25, Nifedipine 60, Labetalol 50 BID held   - DASH/TLC CC Diet     # Likely Prerenal CANDICE on CKDIII  # Hyperkalemia   - Cr 2.0 (BL 1.6), K 5.8   - Hyperkalemia treatment given, BMP at 8 pm   - Home Telmisartan 80 (dose equivalent to losartan 100) held in setting of CANDICE  - S/P 1 L LR, Trend     #Progress Note Handoff  Pending (specify):  follow up ep, loop recorder   Family discussion: house staff updated pt family  Disposition: cardiac telemonitoring   Decision to admit the pt is based on acuity as above   High risk given above acuity and comorbidities , labs reviewed, dw ep

## 2023-04-09 NOTE — CONSULT NOTE ADULT - ASSESSMENT
Mr. Burt is 67 y/o male with past medical history of NIDDM, HTN, DLD, remote history of CVA w/o residual deficit, and chronic kidney disease not on dialysis presents to the emergency department with near syncopal episode that occurred at home. Patient describes feeling lightheaded and dizzy as he stood up from seated position after watching TV. He sat down back for a few minutes and try to stand up again; however, lightheadedness persisted. Patient's son measured vitals at home and noted low BP 60s/40s mmHg and low HR being at 40s bpm. Mr. Burt was brought by his son to ED for evaluation.   On arrival EKG is remarkable for profound sinus bradycardia low 40s bpm. Patient was feeling extremely fatigued.  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 Patient stood up, felt dizzy and fatigued. He was brought to ED in the city for evaluation. As per patient, all test came back normal and he was discharged home. Patient admits to wearing Holter monitor a couple times for 1-2 days at a time. As per patient; no abnormal heart beat was detected.     - presyncope  - marked SB  - HTN  - DM  - CVA  - HLD    TTE report noted, unremarkable  orthostatics noted, negative  please stop Labetalol given episode of marked, symptomatic bradycardia  plan for a loop recorder implant on Monday 4/10/2023 for a long term cardiac arrhythmia monitoring  possible PPM implant was discussed with the patient if symptomatic bradycardia reoccurs being off BB

## 2023-04-09 NOTE — CONSULT NOTE ADULT - TIME BILLING
Bradycardia  syncope  - stop labetolol  - recommend loop recorder implant  - Risk and benefits explained to patient    I have explained the procedure to the patient.  I have explained the risks and benefits of the procedure to the patient.  There is approximately 1% chance of any major cardiovascular complication to occur. Complications include, but are not limited to infection or bleeding,  The patient understands the risk and would like to proceed with the procedure. Patient indicated that all of his questions were answered to his satisfaction and verbalized understanding.

## 2023-04-09 NOTE — CONSULT NOTE ADULT - SUBJECTIVE AND OBJECTIVE BOX
Outpt cardiologist:    Reason for Consult:     HISTORY OF PRESENT ILLNESS:  HPI: 67 y/o male with past medical history of NIDDM, HTN, DLD, remote history of CVA, and chronic kidney disease not on dialysis presents to the emergency department with near syncopal episode that occurred at home. Patient describes feeling lightheaded and dizzy as he stood up from seated position. Patient currently takes labetalol 50 mg twice a day and nifedipine 60 mg once a day. Patient took his medications this morning and does not believe that he overdosed. Patient still does not feel well in the emergency department and feels extremely fatigued. Denies LOC head injury anticoagulation use recent fevers chills chest pain difficulty breathing nausea vomiting abdominal pain or any known history of thyroid disease or any other complaints. Patient reports chronic symptoms of constipation, cold intolerance, and fatigue but says that he had a TSH done 3 weeks ago that was normal.     In the ER:   VS: BP 85/55, HR 45, T 35.9 oral, SpO2 98% RA  EKG: Sinus Zachery + PACs  Sig Labs: Hgb 11.8, INR 1.07, K 5.7, Cr 2.0 (BL 1.6), Trop -ve  [facility]ocus Heart: Normal EF, No pericardial effusion   CXR: No acute CP disease     Interventions in the ER: 1L LR, Insulin + Dextro, Albuterol, Solu-cortef 100, Admit to tele  (2023 16:17)      Cardiology Fellow Notes    Patient with HTN, DM, DLD  CAD by CCTA (LAD and LCx moderate lesions) and negative stress test in 2022  Today the whole day he was feeling un well and fatigued, which he says happens when his FS spike. She he had to get up and walk around until he got too tired so he had to present to the ED.   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 1000 milliLiter(s) (50 mL/Hr) IV Continuous <Continuous>  dextrose 5%. 1000 milliLiter(s) (100 mL/Hr) IV Continuous <Continuous>  dextrose 50% Injectable 25 Gram(s) IV Push once  dextrose 50% Injectable 12.5 Gram(s) IV Push once  dextrose 50% Injectable 25 Gram(s) IV Push once  fenofibrate Tablet 145 milliGRAM(s) Oral daily  glucagon  Injectable 1 milliGRAM(s) IntraMuscular once  heparin   Injectable 5000 Unit(s) SubCutaneous every 8 hours  insulin glargine Injectable (LANTUS) 15 Unit(s) SubCutaneous at bedtime  insulin lispro (ADMELOG) corrective regimen sliding scale   SubCutaneous three times a day before meals  insulin lispro Injectable (ADMELOG) 5 Unit(s) SubCutaneous three times a day before meals    PRN:  dextrose Oral Gel 15 Gram(s) Oral once PRN      HOME MEDICATIONS:  Home Medications:  Aspir 81 oral delayed release tablet: 1 tab(s) orally once a day (2023 13:38)  atorvastatin 20 mg oral tablet: 1 tab(s) orally once a day (2023 13:38)  fenofibrate 145 mg oral tablet: 1 tab(s) orally once a day (2023 13:38)  labetalol 100 mg oral tablet: 1 tab(s) orally 2 times a day (2023 13:38)  metFORMIN 1000 mg oral tablet: 1 tab(s) orally 2 times a day (2023 13:38)  NIFEdipine 60 mg oral tablet, extended release: 1 tab(s) orally once a day (2023 13:38)  telmisartan-hydrochlorothiazide 80 mg-25 mg oral tablet: 1 tab(s) orally once a day (2023 13:38)  Vitamin B12 50 mcg oral tablet: 1 tab(s) orally once a day (2023 13:38)      VITALS:   T(F): 97.7 ( @ 00:58), Max: 97.7 ( @ 00:58)  HR: 86 ( @ 00:58) (45 - 86)  BP: 140/65 ( @ 00:58) (85/55 - 140/65)  BP(mean): 94 ( @ 00:58) (94 - 94)  RR: 18 ( @ 00:58) (18 - 20)  SpO2: 97% ( @ 00:58) (97% - 100%)    I&O's Summary      REVIEW OF SYSTEMS:  CONSTITUTIONAL: No weakness, fevers or chills  HEENT: No visual changes, neck/ear pain  RESPIRATORY: No cough, sob  CARDIOVASCULAR: See HPI  GASTROINTESTINAL: No abdominal pain. No nausea, vomiting, diarrhea   GENITOURINARY: No dysuria, frequency or hematuria  NEUROLOGICAL: No new focal deficits  SKIN: No new rashes    PHYSICAL EXAM:  General: Not in distress.  Non-toxic appearing.   HEENT: EOMI  Cardio: regular, S1, S2, no murmur  Pulm: B/L BS.  No wheezing / crackles / rales  Abdomen: Soft, non-tender, non-distended. Normoactive bowel sounds  Extremities: No edema b/l le  Neuro: A&O x3. No focal deficits    LABS:                        11.8   8.96  )-----------( 316      ( 2023 13:02 )             36.7         142  |  106  |  39<H>  ----------------------------<  156<H>  4.4   |  24  |  1.7<H>    Ca    10.2      2023 22:31  Mg     1.9         TPro  7.0  /  Alb  4.4  /  TBili  0.3  /  DBili  x   /  AST  14  /  ALT  <5  /  AlkPhos  42  04-08    PT/INR - ( 2023 13:02 )   PT: 12.20 sec;   INR: 1.07 ratio         PTT - ( 2023 13:02 )  PTT:34.9 sec  Troponin T, Serum: <0.01 ng/mL (23 @ 22:31)  Troponin T, Serum: <0.01 ng/mL (23 @ 13:02)    CARDIAC MARKERS ( 2023 22:31 )  x     / <0.01 ng/mL / x     / x     / x      CARDIAC MARKERS ( 2023 13:02 )  x     / <0.01 ng/mL / x     / x     / x            Troponin trend:        COVID-19 PCR: NotDetec (2023 13:02)      IMAGING:  -CXR:  -TTE:  -CCTA:  -STRESS TEST:  -CATHETERIZATION:    EC Lead ECG:   Ventricular Rate 44 BPM    Atrial Rate 44 BPM    P-R Interval 158 ms    QRS Duration 84 ms    Q-T Interval 422 ms    QTC Calculation(Bazett) 360 ms    P Axis 27 degrees    R Axis 32 degrees    T Axis 27 degrees    Diagnosis Line Marked sinus bradycardia  Low voltage QRS  Abnormal ECG    Confirmed by Jan Fry ([id number]) on 2023 10:06:47 PM ( @ 12:43)      TELEMETRY EVENTS:   Outpt cardiologist:    Reason for Consult:     HISTORY OF PRESENT ILLNESS:  HPI: 67 y/o male with past medical history of NIDDM, HTN, DLD, remote history of CVA, and chronic kidney disease not on dialysis presents to the emergency department with near syncopal episode that occurred at home. Patient describes feeling lightheaded and dizzy as he stood up from seated position. Patient currently takes labetalol 50 mg twice a day and nifedipine 60 mg once a day. Patient took his medications this morning and does not believe that he overdosed. Patient still does not feel well in the emergency department and feels extremely fatigued. Denies LOC head injury anticoagulation use recent fevers chills chest pain difficulty breathing nausea vomiting abdominal pain or any known history of thyroid disease or any other complaints. Patient reports chronic symptoms of constipation, cold intolerance, and fatigue but says that he had a TSH done 3 weeks ago that was normal.     In the ER:   VS: BP 85/55, HR 45, T 35.9 oral, SpO2 98% RA  EKG: Sinus Zachery + PACs  Sig Labs: Hgb 11.8, INR 1.07, K 5.7, Cr 2.0 (BL 1.6), Trop -ve  [facility] Heart: Normal EF, No pericardial effusion   CXR: No acute CP disease     Interventions in the ER: 1L LR, Insulin + Dextro, Albuterol, Solu-cortef 100, Admit to tele  (2023 16:17)      Cardiology Fellow Notes    Patient with HTN, DM, DLD  CAD by CCTA (LAD and LCx moderate lesions) and negative stress test in 2022  Today the whole day he was feeling un well and fatigued, which he says happens when his FS spike. She he had to get up and walk around until he got too tired so he had to present to the ED.   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 1000 milliLiter(s) (50 mL/Hr) IV Continuous <Continuous>  dextrose 5%. 1000 milliLiter(s) (100 mL/Hr) IV Continuous <Continuous>  dextrose 50% Injectable 25 Gram(s) IV Push once  dextrose 50% Injectable 12.5 Gram(s) IV Push once  dextrose 50% Injectable 25 Gram(s) IV Push once  fenofibrate Tablet 145 milliGRAM(s) Oral daily  glucagon  Injectable 1 milliGRAM(s) IntraMuscular once  heparin   Injectable 5000 Unit(s) SubCutaneous every 8 hours  insulin glargine Injectable (LANTUS) 15 Unit(s) SubCutaneous at bedtime  insulin lispro (ADMELOG) corrective regimen sliding scale   SubCutaneous three times a day before meals  insulin lispro Injectable (ADMELOG) 5 Unit(s) SubCutaneous three times a day before meals    PRN:  dextrose Oral Gel 15 Gram(s) Oral once PRN      HOME MEDICATIONS:  Home Medications:  Aspir 81 oral delayed release tablet: 1 tab(s) orally once a day (2023 13:38)  atorvastatin 20 mg oral tablet: 1 tab(s) orally once a day (2023 13:38)  fenofibrate 145 mg oral tablet: 1 tab(s) orally once a day (2023 13:38)  labetalol 100 mg oral tablet: 1 tab(s) orally 2 times a day (2023 13:38)  metFORMIN 1000 mg oral tablet: 1 tab(s) orally 2 times a day (2023 13:38)  NIFEdipine 60 mg oral tablet, extended release: 1 tab(s) orally once a day (2023 13:38)  telmisartan-hydrochlorothiazide 80 mg-25 mg oral tablet: 1 tab(s) orally once a day (2023 13:38)  Vitamin B12 50 mcg oral tablet: 1 tab(s) orally once a day (2023 13:38)      VITALS:   T(F): 97.7 ( @ 00:58), Max: 97.7 ( @ 00:58)  HR: 86 ( @ 00:58) (45 - 86)  BP: 140/65 ( @ 00:58) (85/55 - 140/65)  BP(mean): 94 ( @ 00:58) (94 - 94)  RR: 18 ( @ 00:58) (18 - 20)  SpO2: 97% ( @ 00:58) (97% - 100%)    I&O's Summary      REVIEW OF SYSTEMS:  CONSTITUTIONAL: No weakness, fevers or chills  HEENT: No visual changes, neck/ear pain  RESPIRATORY: No cough, sob  CARDIOVASCULAR: See HPI  GASTROINTESTINAL: No abdominal pain. No nausea, vomiting, diarrhea   GENITOURINARY: No dysuria, frequency or hematuria  NEUROLOGICAL: No new focal deficits  SKIN: No new rashes  10 point ROS completed; negative except as stated in HPI    PHYSICAL EXAM:  General: Not in distress.  Non-toxic appearing.   HEENT: EOMI, PERRLA  Neck: supple, no JVD  Cardio: regular, S1, S2, no murmur  Pulm: B/L BS.  No wheezing / crackles / rales  Abdomen: Soft, non-tender, non-distended. Normoactive bowel sounds  Extremities: No edema b/l le  Neuro: A&O x3. No focal deficits    LABS:                        11.8   8.96  )-----------( 316      ( 2023 13:02 )             36.7         142  |  106  |  39<H>  ----------------------------<  156<H>  4.4   |  24  |  1.7<H>    Ca    10.2      2023 22:31  Mg     1.9         TPro  7.0  /  Alb  4.4  /  TBili  0.3  /  DBili  x   /  AST  14  /  ALT  <5  /  AlkPhos  42      PT/INR - ( 2023 13:02 )   PT: 12.20 sec;   INR: 1.07 ratio         PTT - ( 2023 13:02 )  PTT:34.9 sec  Troponin T, Serum: <0.01 ng/mL (23 @ 22:31)  Troponin T, Serum: <0.01 ng/mL (23 @ 13:02)    CARDIAC MARKERS ( 2023 22:31 )  x     / <0.01 ng/mL / x     / x     / x      CARDIAC MARKERS ( 2023 13:02 )  x     / <0.01 ng/mL / x     / x     / x            Troponin trend:        COVID-19 PCR: NotDetec (2023 13:02)      IMAGING:  -CXR:  -TTE:  -CCTA:  -STRESS TEST:  -CATHETERIZATION:    EC Lead ECG:   Ventricular Rate 44 BPM    Atrial Rate 44 BPM    P-R Interval 158 ms    QRS Duration 84 ms    Q-T Interval 422 ms    QTC Calculation(Bazett) 360 ms    P Axis 27 degrees    R Axis 32 degrees    T Axis 27 degrees    Diagnosis Line Marked sinus bradycardia  Low voltage QRS  Abnormal ECG    Confirmed by Jan Fry ([id number]) on 2023 10:06:47 PM ( @ 12:43)      TELEMETRY EVENTS:

## 2023-04-09 NOTE — PROGRESS NOTE ADULT - SUBJECTIVE AND OBJECTIVE BOX
Patient is a 68y old  Male who presents with a chief complaint of Pre-syncope (04-09-23)      Pt seen and examined at bedside. No CP or SOB.      PAST MEDICAL & SURGICAL HISTORY:  DM (diabetes mellitus)    HTN (hypertension)    HLD (hyperlipidemia)    CVA (cerebrovascular accident)    Status post cataract extraction and insertion of intraocular lens of left eye        VITAL SIGNS (Last 24 hrs):  T(C): 36.1 (04-09-23 @ 08:20), Max: 36.5 (04-09-23 @ 00:58)  HR: 77 (04-09-23 @ 08:20) (45 - 86)  BP: 128/68 (04-09-23 @ 08:20) (85/55 - 141/63)  RR: 18 (04-09-23 @ 08:20) (18 - 20)  SpO2: 97% (04-09-23 @ 08:20) (97% - 100%)  Wt(kg): --  Daily Height in cm: 170.18 (08 Apr 2023 12:44)    Daily     I&O's Summary      PHYSICAL EXAM:  GENERAL: NAD, well-developed  HEAD:  Atraumatic, Normocephalic  EYES: EOMI, PERRLA, conjunctiva and sclera clear  NECK: Supple, No JVD  CHEST/LUNG: Clear to auscultation bilaterally; No wheeze  HEART: Regular rate and rhythm; No murmurs, rubs, or gallops  ABDOMEN: Soft, Nontender, Nondistended; Bowel sounds present  EXTREMITIES:  2+ Peripheral Pulses, No clubbing, cyanosis, or edema  PSYCH: AAOx3  NEUROLOGY: non-focal  SKIN: No rashes or lesions    Labs Reviewed  Spoke to patient in regards to abnormal labs.    CBC Full  -  ( 09 Apr 2023 06:31 )  WBC Count : 7.22 K/uL  Hemoglobin : 10.2 g/dL  Hematocrit : 30.8 %  Platelet Count - Automated : 283 K/uL  Mean Cell Volume : 83.2 fL  Mean Cell Hemoglobin : 27.6 pg  Mean Cell Hemoglobin Concentration : 33.1 g/dL  Auto Neutrophil # : x  Auto Lymphocyte # : x  Auto Monocyte # : x  Auto Eosinophil # : x  Auto Basophil # : x  Auto Neutrophil % : x  Auto Lymphocyte % : x  Auto Monocyte % : x  Auto Eosinophil % : x  Auto Basophil % : x    BMP:    04-09 @ 06:31    Blood Urea Nitrogen - 36  Calcium - 10.0  Carbond Dioxide - 24  Chloride - 107  Creatinine - 1.4  Glucose - 106  Potassium - 3.8  Sodium - 143      Hemoglobin A1c -   PT/INR - ( 08 Apr 2023 13:02 )   PT: 12.20 sec;   INR: 1.07 ratio         PTT - ( 08 Apr 2023 13:02 )  PTT:34.9 sec  Urine Culture:        COVID Labs  CRP:      D-Dimer:            Imaging reviewed independently and reviewed read    < from: Xray Chest 1 View- PORTABLE-Urgent (04.08.23 @ 13:40) >  Impression:    No radiographic evidence of acute cardiopulmonary disease.    < end of copied text >      MEDICATIONS  (STANDING):  atorvastatin 20 milliGRAM(s) Oral at bedtime  dextrose 5%. 1000 milliLiter(s) (100 mL/Hr) IV Continuous <Continuous>  dextrose 5%. 1000 milliLiter(s) (50 mL/Hr) IV Continuous <Continuous>  dextrose 50% Injectable 25 Gram(s) IV Push once  dextrose 50% Injectable 12.5 Gram(s) IV Push once  dextrose 50% Injectable 25 Gram(s) IV Push once  fenofibrate Tablet 145 milliGRAM(s) Oral daily  glucagon  Injectable 1 milliGRAM(s) IntraMuscular once  heparin   Injectable 5000 Unit(s) SubCutaneous every 8 hours  insulin glargine Injectable (LANTUS) 15 Unit(s) SubCutaneous at bedtime  insulin lispro (ADMELOG) corrective regimen sliding scale   SubCutaneous three times a day before meals  insulin lispro Injectable (ADMELOG) 5 Unit(s) SubCutaneous three times a day before meals    MEDICATIONS  (PRN):  dextrose Oral Gel 15 Gram(s) Oral once PRN Blood Glucose LESS THAN 70 milliGRAM(s)/deciliter

## 2023-04-09 NOTE — CONSULT NOTE ADULT - ASSESSMENT
Impression   # One time sinus bradycardia on presentation  ECG wihtout ischemic changes   # Possible orthostatic hypotension   # Non obstructive CAD    Recommendations   - Recommend to keep on tele. If no recurrence of bradycardia, can keep same dose of labetalol, otherwise can decrease by half   - Check orthostatics   - Check TTE while in house   - He is due to endo visit on Tuesday. If remains in house then consider endo evaluation as symptoms are related to spikes in FS \  - Continue aspirin and statin     This a preliminary plan. Will need to discuss with attending.   Signature: Juan ORLANDO, 1st year Cardiology Fellow   Impression   # One time sinus bradycardia on presentation; ECG without ischemic changes   # Possible orthostatic hypotension   # Non obstructive CAD    Recommendations   - Recommend to keep on tele. If no recurrence of bradycardia, can keep same dose of labetalol, otherwise can decrease by half   - Check orthostatics   - Check TTE while in house   - He is due to endo visit on Tuesday. If remains in house then consider endo evaluation as symptoms are related to spikes in FS \  - Continue aspirin and statin     This a preliminary plan. Will need to discuss with attending.   Signature: Juan ORLANDO, 1st year Cardiology Fellow

## 2023-04-09 NOTE — ED ADULT NURSE REASSESSMENT NOTE - NS ED NURSE REASSESS COMMENT FT1
Report received from previous shift LOREE Trevizo. Pt admitted to [facility] for . Pt AOx4. BP meds on hold as per MD. VS stable - /67, HR 62, RR 18, O2 sat 97% on RA. Report received from previous shift LOREE Trevizo. Pt admitted to TELE for sinus bradycardia, near syncope, hyperkalemia. Pt AOx4, pt updated on plan of care and awaits cardiology and PT consult. Pt also has loop recorder implant scheduled for Mon, 4/10. However, pt states that he does not want procedure performed and would like to speak to his cardiologist MD Edwards. Covering MD Hugo notified. Otherwise, VS stable - /67, HR 62, RR 18, O2 sat 97% on RA. BP meds on hold as per MD.

## 2023-04-09 NOTE — CONSULT NOTE ADULT - ATTENDING COMMENTS
Briefly, 68 year old man for whom cardiology is consulted for near syncope. Patient's EKG, tele reviewed with sinus bradycardia and no ischemic changes. Would check echocardiogram and monitor on telemetry.     Thank you for this consult. Please call/MS teams with questions.

## 2023-04-09 NOTE — CONSULT NOTE ADULT - SUBJECTIVE AND OBJECTIVE BOX
Patient is a 68y old  Male who presents with a chief complaint of Pre-syncope (2023 02:59)    Primary cardiologist: Dr. Bib Edwards    HPI:  Mr. Burt is 67 y/o male with past medical history of NIDDM, HTN, DLD, remote history of CVA w/o residual deficit, and chronic kidney disease not on dialysis presents to the emergency department with near syncopal episode that occurred at home. Patient describes feeling lightheaded and dizzy as he stood up from seated position after watching TV. He sat down back for a few minutes and try to stand up again; however, lightheadedness persisted. Patient's son measured vitals at home and noted low BP 60s/40s mmHg and low HR being at 40s bpm. Mr. Burt was brought by his son to ED for evaluation.   On arrival EKG is remarkable for profound sinus bradycardia low 40s bpm. Patient was feeling extremely fatigued.  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 Patient stood up, felt dizzy and fatigued. He was brought to ED in the city for evaluation. As per patient, all test came back normal and he was discharged home. Patient admits to wearing Holter monitor a couple times for 1-2 days at a time. As per patient; no abnormal heart beat was detected.   Patient currently takes labetalol 100 mg twice a day.    Denies LOC, palpitations, chest pain, SOB or any other symptoms.     In the ER:   VS: BP 85/55, HR 45, T 35.9 oral, SpO2 98% RA  EKG: NSR  Sig Labs: Hgb 11.8, INR 1.07, K 5.7, Cr 2.0 (BL 1.6), Trop -ve  [facility] Heart: Normal EF, No pericardial effusion   CXR: No acute CP disease     REVIEW OF SYSTEMS  [x ] A ten-point review of systems was otherwise negative except as noted.  [ ] Due to altered mental status/intubation, subjective information were not able to be obtained from the patient. History was obtained, to the extent possible, from review of the chart and collateral sources of information.    PAST MEDICAL & SURGICAL HISTORY:  DM (diabetes mellitus)  HTN (hypertension)  HLD (hyperlipidemia)  CVA (cerebrovascular accident)  Status post cataract extraction and insertion of intraocular lens of left eye    Home Medications:  Aspir 81 oral delayed release tablet: 1 tab(s) orally once a day (2023 13:38)  atorvastatin 20 mg oral tablet: 1 tab(s) orally once a day (2023 13:38)  fenofibrate 145 mg oral tablet: 1 tab(s) orally once a day (2023 13:38)  labetalol 100 mg oral tablet: 1 tab(s) orally 2 times a day (2023 13:38)  metFORMIN 1000 mg oral tablet: 1 tab(s) orally 2 times a day (2023 13:38)  NIFEdipine 60 mg oral tablet, extended release: 1 tab(s) orally once a day (2023 13:38)  telmisartan-hydrochlorothiazide 80 mg-25 mg oral tablet: 1 tab(s) orally once a day (2023 13:38)  Vitamin B12 50 mcg oral tablet: 1 tab(s) orally once a day (2023 13:38)      Allergies:  No Known Allergies      PREVIOUS DIAGNOSTIC TESTING:      ECHO 2023  FINDINGS:  Summary:   1. Normal global left ventricular systolic function.   2. Normal left atrial size.   3. Normal right atrial size.   4. No evidence of mitral valve regurgitation.      NUCLEAR STRESS 2022  FINDINGS:  Impression:  1.   Normal myocardial perfusion . No evidence of ischemia or infarction.  2.  Left ventricular ejection fraction of > 55 %.  3.  The previously described inferior perfusion defect from  is no   longer appreciated. TID is no longer seen.      CCTA 2020  IMPRESSION:   Motion artifact is present, limiting evaluation.  Calcified and mixed plaque in the proximal and mid LAD; motion artifact precludes exact estimation of stenosis. Mid and distal LAD appear grossly patent.  Calcified and mixed plaque in the proximal and mid LCx resulting in areas of apparent mild stenosis. Distal LCx appears grossly patent.  Grossly patent diminutive (nondominant) RCA  The total Agatston coronary artery calcium score equals 145, which corresponds to 62nd percentile for age, gender and ethnicity.   CAD-RADS 2/N.      Vital Signs Last 24 Hrs  T(C): 36.1 (2023 08:20), Max: 36.5 (2023 00:58)  T(F): 97 (2023 08:20), Max: 97.7 (2023 00:58)  HR: 77 (2023 08:20) (45 - 86)  BP: 128/68 (2023 08:20) (85/55 - 141/63)  BP(mean): 94 (2023 00:58) (94 - 94)  RR: 18 (2023 08:20) (18 - 20)  SpO2: 97% (2023 08:20) (97% - 100%)    Parameters below as of 2023 06:04  Patient On (Oxygen Delivery Method): room air      PHYSICAL EXAM:  GENERAL: In no apparent distress, well nourished, and hydrated.  NECK: Supple  HEART: Regular rate and rhythm;   PULMONARY: Clear to auscultation and perfusion.    EXTREMITIES:  2+ Peripheral Pulses,  NEUROLOGICAL: Grossly nonfocal      INTERPRETATION OF TELEMETRY:  EC Lead ECG:   Ventricular Rate 44 BPM  Atrial Rate 44 BPM  P-R Interval 158 ms  QRS Duration 84 ms  Q-T Interval 422 ms  QTC Calculation(Bazett) 360 ms  P Axis 27 degrees  R Axis 32 degrees  T Axis 27 degrees      LABS:                        10.2   7.22  )-----------( 283      ( 2023 06:31 )             30.8     04-09    143  |  107  |  36<H>  ----------------------------<  106<H>  3.8   |  24  |  1.4    Ca    10.0      2023 06:31  Mg     1.9     04-09    TPro  6.2  /  Alb  3.9  /  TBili  0.3  /  DBili  x   /  AST  13  /  ALT  6   /  AlkPhos  25<L>  04-09    CARDIAC MARKERS ( 2023 22:31 )  x     / <0.01 ng/mL / x     / x     / x      CARDIAC MARKERS ( 2023 13:02 )  x     / <0.01 ng/mL / x     / x     / x          PT/INR - ( 2023 13:02 )   PT: 12.20 sec;   INR: 1.07 ratio      PTT - ( 2023 13:02 )  PTT:34.9 sec  BNP  Thyroid Stimulating Hormone, Serum: 4.19 uIU/mL [0.27 - 4.20] (23 @ 13:02)  COVID-19 PCR: NotDetec (23 @ 13:02)

## 2023-04-10 ENCOUNTER — TRANSCRIPTION ENCOUNTER (OUTPATIENT)
Age: 68
End: 2023-04-10

## 2023-04-10 VITALS
SYSTOLIC BLOOD PRESSURE: 150 MMHG | RESPIRATION RATE: 18 BRPM | HEART RATE: 62 BPM | OXYGEN SATURATION: 98 % | TEMPERATURE: 98 F | DIASTOLIC BLOOD PRESSURE: 73 MMHG

## 2023-04-10 LAB
A1C WITH ESTIMATED AVERAGE GLUCOSE RESULT: 6.6 % — HIGH (ref 4–5.6)
ANION GAP SERPL CALC-SCNC: 11 MMOL/L — SIGNIFICANT CHANGE UP (ref 7–14)
B BURGDOR C6 AB SER-ACNC: NEGATIVE — SIGNIFICANT CHANGE UP
B BURGDOR IGG+IGM SER-ACNC: 0.04 INDEX — SIGNIFICANT CHANGE UP (ref 0.01–0.89)
BASOPHILS # BLD AUTO: 0.08 K/UL — SIGNIFICANT CHANGE UP (ref 0–0.2)
BASOPHILS NFR BLD AUTO: 1.3 % — HIGH (ref 0–1)
BUN SERPL-MCNC: 28 MG/DL — HIGH (ref 10–20)
CALCIUM SERPL-MCNC: 9.8 MG/DL — SIGNIFICANT CHANGE UP (ref 8.4–10.5)
CHLORIDE SERPL-SCNC: 103 MMOL/L — SIGNIFICANT CHANGE UP (ref 98–110)
CO2 SERPL-SCNC: 25 MMOL/L — SIGNIFICANT CHANGE UP (ref 17–32)
CREAT SERPL-MCNC: 1.4 MG/DL — SIGNIFICANT CHANGE UP (ref 0.7–1.5)
EGFR: 55 ML/MIN/1.73M2 — LOW
EOSINOPHIL # BLD AUTO: 0.33 K/UL — SIGNIFICANT CHANGE UP (ref 0–0.7)
EOSINOPHIL NFR BLD AUTO: 5.4 % — SIGNIFICANT CHANGE UP (ref 0–8)
ESTIMATED AVERAGE GLUCOSE: 143 MG/DL — HIGH (ref 68–114)
GLUCOSE BLDC GLUCOMTR-MCNC: 108 MG/DL — HIGH (ref 70–99)
GLUCOSE SERPL-MCNC: 115 MG/DL — HIGH (ref 70–99)
HCT VFR BLD CALC: 35.3 % — LOW (ref 42–52)
HCV AB S/CO SERPL IA: 0.03 COI — SIGNIFICANT CHANGE UP
HCV AB SERPL-IMP: SIGNIFICANT CHANGE UP
HGB BLD-MCNC: 11.3 G/DL — LOW (ref 14–18)
IMM GRANULOCYTES NFR BLD AUTO: 0.3 % — SIGNIFICANT CHANGE UP (ref 0.1–0.3)
LYMPHOCYTES # BLD AUTO: 1.75 K/UL — SIGNIFICANT CHANGE UP (ref 1.2–3.4)
LYMPHOCYTES # BLD AUTO: 28.5 % — SIGNIFICANT CHANGE UP (ref 20.5–51.1)
MAGNESIUM SERPL-MCNC: 2 MG/DL — SIGNIFICANT CHANGE UP (ref 1.8–2.4)
MCHC RBC-ENTMCNC: 27.4 PG — SIGNIFICANT CHANGE UP (ref 27–31)
MCHC RBC-ENTMCNC: 32 G/DL — SIGNIFICANT CHANGE UP (ref 32–37)
MCV RBC AUTO: 85.7 FL — SIGNIFICANT CHANGE UP (ref 80–94)
MONOCYTES # BLD AUTO: 0.66 K/UL — HIGH (ref 0.1–0.6)
MONOCYTES NFR BLD AUTO: 10.8 % — HIGH (ref 1.7–9.3)
NEUTROPHILS # BLD AUTO: 3.29 K/UL — SIGNIFICANT CHANGE UP (ref 1.4–6.5)
NEUTROPHILS NFR BLD AUTO: 53.7 % — SIGNIFICANT CHANGE UP (ref 42.2–75.2)
NRBC # BLD: 0 /100 WBCS — SIGNIFICANT CHANGE UP (ref 0–0)
PLATELET # BLD AUTO: 303 K/UL — SIGNIFICANT CHANGE UP (ref 130–400)
POTASSIUM SERPL-MCNC: 4.4 MMOL/L — SIGNIFICANT CHANGE UP (ref 3.5–5)
POTASSIUM SERPL-SCNC: 4.4 MMOL/L — SIGNIFICANT CHANGE UP (ref 3.5–5)
RBC # BLD: 4.12 M/UL — LOW (ref 4.7–6.1)
RBC # FLD: 14.1 % — SIGNIFICANT CHANGE UP (ref 11.5–14.5)
SODIUM SERPL-SCNC: 139 MMOL/L — SIGNIFICANT CHANGE UP (ref 135–146)
TSH SERPL-MCNC: 2.35 UIU/ML — SIGNIFICANT CHANGE UP (ref 0.27–4.2)
WBC # BLD: 6.13 K/UL — SIGNIFICANT CHANGE UP (ref 4.8–10.8)
WBC # FLD AUTO: 6.13 K/UL — SIGNIFICANT CHANGE UP (ref 4.8–10.8)

## 2023-04-10 PROCEDURE — 99239 HOSP IP/OBS DSCHRG MGMT >30: CPT

## 2023-04-10 RX ORDER — LABETALOL HCL 100 MG
1 TABLET ORAL
Qty: 0 | Refills: 0 | DISCHARGE

## 2023-04-10 RX ADMIN — HEPARIN SODIUM 5000 UNIT(S): 5000 INJECTION INTRAVENOUS; SUBCUTANEOUS at 08:36

## 2023-04-10 NOTE — DISCHARGE NOTE NURSING/CASE MANAGEMENT/SOCIAL WORK - NSDCPEFALRISK_GEN_ALL_CORE
For information on Fall & Injury Prevention, visit: [email or web address] OR  [email or web address] OR  https://www.cdc.gov/steadi/patient.html

## 2023-04-10 NOTE — PHYSICAL THERAPY INITIAL EVALUATION ADULT - GENERAL OBSERVATIONS, REHAB EVAL
Pt case discussed in rounds with Dr Charlton and LOREE Trevizo, pt ambulating independently throughout unit, no skilled PT needs, d/c from services. Please reconsult as appropriate.

## 2023-04-10 NOTE — DISCHARGE NOTE PROVIDER - CARE PROVIDER_API CALL
Osman Bowman  Phone: (   )    -  Fax: (   )    -  Follow Up Time: 2 weeks    Bib Edwards (MD)  Cardiovascular Disease; Internal Medicine; Interventional Cardiology  [address] [place] [address]  Phone: [phone]  Fax: [phone]  Follow Up Time: 2 weeks   Bib Edwards (MD)  Cardiovascular Disease; Internal Medicine; Interventional Cardiology  [address] [facility]  [place] [address]  Phone: [phone]  Fax: [phone]  Follow Up Time: 2 weeks    Osman Bowman  Phone: (   )    -  Fax: (   )    -  Follow Up Time: 2 weeks    Finn Frazier)  Nephrology  [address] [facility], [address]  [place] [address]  Phone: [phone]  Fax: [phone]  Follow Up Time: 2 weeks   Bib Edwards (MD)  Cardiovascular Disease; Internal Medicine; Interventional Cardiology  [address] [facility]  [place] [address]  Phone: [phone]  Fax: [phone]  Follow Up Time: 2 weeks    Finn Frazier)  Nephrology  [address] [facility], [address]  [place] [address]  Phone: [phone]  Fax: [phone]  Follow Up Time: 2 weeks    Osman Bowman  Phone: (   )    -  Fax: (   )    -  Follow Up Time: 2 weeks    Carmelo Mccauley; BETTY)  CardiologyElectrophyslgy [facility]  [address] [facility] [address]  [place] [address]  Phone: [phone]  Fax: [phone]  Follow Up Time: Routine

## 2023-04-10 NOTE — DISCHARGE NOTE PROVIDER - CARE PROVIDERS DIRECT ADDRESSES
,DirectAddress_Unknown,[email or web address],DirectAddress_Unknown,[email or web address] ,[email or web address],DirectAddress_Unknown,DirectAddress_Unknown,[email or web address]

## 2023-04-10 NOTE — DISCHARGE NOTE PROVIDER - NSDCCPCAREPLAN_GEN_ALL_CORE_FT
PRINCIPAL DISCHARGE DIAGNOSIS  Diagnosis: Sinus bradycardia  Assessment and Plan of Treatment: You were admitted to [facility] for a near-syncopal episode. You were noted to have profound bradycardia ( a person's heartbeat is slower than normal) w/ HR in 40s. This is suspected to be caused by one of your medications, Labetalol, which was stopped dring this admission. You were recommended to have a loop recorder implanted during this admission, however you declined. It is very important to follow-up w/ your cardiologist and PCP for further work-up.  Please make sure to go to the hospital IMMEDIATELY if you have any of the following symptoms:  Feeling light-headed or dizzy  Fainting or feeling like you are going to faint  Chest pain  Trouble breathing        SECONDARY DISCHARGE DIAGNOSES  Diagnosis: Near syncope  Assessment and Plan of Treatment:     Diagnosis: Hyperkalemia  Assessment and Plan of Treatment:

## 2023-04-10 NOTE — DISCHARGE NOTE PROVIDER - HOSPITAL COURSE
67 y/o M w/ PMHx of NIDDM, HTN, DLD, remote history of CVA, and CKD (not on dialysis) presented from home to the ED on 4/8/23 with near-syncopal episode. Reports feeling lightheaded and dizzy as he stood up from seated position. In the ED, VS significant for hypotension w/ BP of 85/55, bradycardia w/ HR 45. EKG significant for sinus nadiya + PACs. Labs significant for anemia w/ Hb 11.8, INR 1.07, K 5.7, Cr 2.0 (BL 1.6), Trop -ve. S/p 1L LR, Insulin + Dextro, Albuterol, Solu-cortef 100. Admitted to tele for near-syncope and symptomatic bradycardia. Home labetalol 50 mg BID held 2/2 possible drug-induced bradycardia. TSH WNL. Orthostatics negative. ECHO (4/9/23): unremarkable w/ normal global  LV systolic function. EP was consulted & recommended loop recorder implant on 4/10/2023 for a long term cardiac arrhythmia monitoring; possible PPM implant was discussed with the patient if symptomatic bradycardia reoccurs being off BB. Patient declined loop recorder implant despite discussion of risks and benefits. States he will f/u w/ OP cardiologist. No further episodes of near-syncope or bradycardia noted since Labetalol held. Pt is medically stable for d/c to home.     Will remain off Labetalol. Restarted home Telmisartan-HCTZ and Nifedipine as BP was elevated -150s while off home anti-hypertensives.   CANDICE resolved, Cr back to BL (BL Cr of 1.4 on 6/2020)

## 2023-04-10 NOTE — DISCHARGE NOTE PROVIDER - NPI NUMBER (FOR SYSADMIN USE ONLY) :
[UNKNOWN],[[id number]] [[id number]],[UNKNOWN],[[id number]] [[id number]],[[id number]],[UNKNOWN],[[id number]]

## 2023-04-10 NOTE — DISCHARGE NOTE NURSING/CASE MANAGEMENT/SOCIAL WORK - PATIENT PORTAL LINK FT
You can access the FollowMyHealth Patient Portal offered by [facility] by registering at the following website: [email or web address]. By joining [facility]’s FollowMyHealth portal, you will also be able to view your health information using other applications (apps) compatible with our system.

## 2023-04-10 NOTE — DISCHARGE NOTE PROVIDER - ATTENDING DISCHARGE PHYSICAL EXAMINATION:
Attending attestation  Attending DC note  Pt seen and examined at bedside. No cp or sob. pt refused loop recorder. Wants outpt follow up  vitals, labs, exam stable  Hospital course as above.  Plan dw pt and agreed to plan  Medically cleared for DC. Med recc completed.  ANDREW resident. Spent 32 mins on case

## 2023-04-10 NOTE — DISCHARGE NOTE PROVIDER - PROVIDER TOKENS
FREE:[LAST:[Bowman],FIRST:[Osman],PHONE:[(   )    -],FAX:[(   )    -],FOLLOWUP:[2 weeks]],PROVIDER:[TOKEN:[[id number]],FOLLOWUP:[2 weeks]] PROVIDER:[TOKEN:[[id number]],FOLLOWUP:[2 weeks]],FREE:[LAST:[Bowman],FIRST:[Osman],PHONE:[(   )    -],FAX:[(   )    -],FOLLOWUP:[2 weeks]],PROVIDER:[TOKEN:[[id number]],FOLLOWUP:[2 weeks]] PROVIDER:[TOKEN:[[id number]],FOLLOWUP:[2 weeks]],PROVIDER:[TOKEN:[[id number]],FOLLOWUP:[2 weeks]],FREE:[LAST:[Bowman],FIRST:[Osman],PHONE:[(   )    -],FAX:[(   )    -],FOLLOWUP:[2 weeks]],PROVIDER:[TOKEN:[[id number]],FOLLOWUP:[Routine]]

## 2023-04-13 DIAGNOSIS — Z86.73 PERSONAL HISTORY OF TRANSIENT ISCHEMIC ATTACK (TIA), AND CEREBRAL INFARCTION WITHOUT RESIDUAL DEFICITS: ICD-10-CM

## 2023-04-13 DIAGNOSIS — Z41.8 ENCOUNTER FOR OTHER PROCEDURES FOR PURPOSES OTHER THAN REMEDYING HEALTH STATE: ICD-10-CM

## 2023-04-13 DIAGNOSIS — N18.30 CHRONIC KIDNEY DISEASE, STAGE 3 UNSPECIFIED: ICD-10-CM

## 2023-04-13 DIAGNOSIS — N17.9 ACUTE KIDNEY FAILURE, UNSPECIFIED: ICD-10-CM

## 2023-04-13 DIAGNOSIS — E11.22 TYPE 2 DIABETES MELLITUS WITH DIABETIC CHRONIC KIDNEY DISEASE: ICD-10-CM

## 2023-04-13 DIAGNOSIS — T44.8X1A POISONING BY CENTRALLY-ACTING AND ADRENERGIC-NEURON-BLOCKING AGENTS, ACCIDENTAL (UNINTENTIONAL), INITIAL ENCOUNTER: ICD-10-CM

## 2023-04-13 DIAGNOSIS — E11.21 TYPE 2 DIABETES MELLITUS WITH DIABETIC NEPHROPATHY: ICD-10-CM

## 2023-04-13 DIAGNOSIS — E87.5 HYPERKALEMIA: ICD-10-CM

## 2023-04-13 DIAGNOSIS — I95.2 HYPOTENSION DUE TO DRUGS: ICD-10-CM
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
## 2023-04-13 DIAGNOSIS — T46.1X1A POISONING BY CALCIUM-CHANNEL BLOCKERS, ACCIDENTAL (UNINTENTIONAL), INITIAL ENCOUNTER: ICD-10-CM

## 2023-04-13 DIAGNOSIS — I12.9 HYPERTENSIVE CHRONIC KIDNEY DISEASE WITH STAGE 1 THROUGH STAGE 4 CHRONIC KIDNEY DISEASE, OR UNSPECIFIED CHRONIC KIDNEY DISEASE: ICD-10-CM

## 2023-04-13 DIAGNOSIS — R00.1 BRADYCARDIA, UNSPECIFIED: ICD-10-CM

## 2023-04-14 PROBLEM — I63.9 CEREBRAL INFARCTION, UNSPECIFIED: Chronic | Status: ACTIVE | Noted: 2023-04-08

## 2023-04-25 ENCOUNTER — APPOINTMENT (OUTPATIENT)
Dept: CARDIOLOGY | Facility: CLINIC | Age: 68
End: 2023-04-25
Payer: COMMERCIAL

## 2023-04-25 VITALS
TEMPERATURE: 97.1 F | SYSTOLIC BLOOD PRESSURE: 120 MMHG | DIASTOLIC BLOOD PRESSURE: 76 MMHG | RESPIRATION RATE: 16 BRPM | HEIGHT: 68 IN | BODY MASS INDEX: 31.37 KG/M2 | WEIGHT: 207 LBS | HEART RATE: 60 BPM

## 2023-04-25 PROCEDURE — 93000 ELECTROCARDIOGRAM COMPLETE: CPT

## 2023-04-25 PROCEDURE — 99214 OFFICE O/P EST MOD 30 MIN: CPT

## 2023-04-25 NOTE — PHYSICAL EXAM
[Normal Appearance] : normal appearance [Well Groomed] : well groomed [No Deformities] : no deformities [General Appearance - In No Acute Distress] : no acute distress [Normal Conjunctiva] : the conjunctiva exhibited no abnormalities [Eyelids - No Xanthelasma] : the eyelids demonstrated no xanthelasmas [FreeTextEntry1] : no JVD [] : no respiratory distress [Respiration, Rhythm And Depth] : normal respiratory rhythm and effort [Exaggerated Use Of Accessory Muscles For Inspiration] : no accessory muscle use [Auscultation Breath Sounds / Voice Sounds] : lungs were clear to auscultation bilaterally [Heart Rate And Rhythm] : heart rate and rhythm were normal [Heart Sounds] : normal S1 and S2 [Murmurs] : no murmurs present [Edema] : no peripheral edema present [Bowel Sounds] : normal bowel sounds [Abdomen Soft] : soft [Abdomen Tenderness] : non-tender [Abnormal Walk] : normal gait [Cyanosis, Localized] : no localized cyanosis [Nail Clubbing] : no clubbing of the fingernails [Skin Color & Pigmentation] : normal skin color and pigmentation [Skin Turgor] : normal skin turgor [Oriented To Time, Place, And Person] : oriented to person, place, and time [Affect] : the affect was normal

## 2023-04-25 NOTE — HISTORY OF PRESENT ILLNESS
[FreeTextEntry1] : 68 y/o male with history of HTN, DM, DL,  Post  [facility] ER  visit  on 11/2020 for  chest discomfort. CCTA was abnormal with LAD lesion, which was not well evaluated due to motion artifact. The patient also had a non-obstructive lesion in the LCX. RCA was small (non-dominant).  Had an episode of chest pain - last stress test in 2020. No palpitations. Clinically stable. Had dizziness again. Was in the hospital with bradycardia - stopped labetalol. Still with dizziness. LDL is controlled.\par \par Holter/MCOT - negative for sustained arrhythmia\par Nuclear MPI - small inferior defect.\par Pt  doesn't exercise, has stairs in his house and has no SOB no chest pain, no edema.  Hx of palpitations-- resolved.  Teaching is provided importance to comply with low cholesterol, low sodium, diabetic diet.

## 2023-04-25 NOTE — ASSESSMENT
[FreeTextEntry1] : CAD by CT scan\par No surgical disease\par LAD lesion - negative nuclear MPI in anterior wall. No LAD ischemia. Mild lesion in LCX - small inferior wall defect.\par DM, DL, HTN\par C/w non-obstructive CAD\par Dizziness, bradycardia. He was offered ILR, but declined. Will get an MCOT\par \par C/w medical therapy\par C/w ASA, statin. DM, Lipid control.\par Hold b-blocker\par C/w Nifedipine\par Repeat stress test in November 2022 - negative.\par \par \par Labs with PMD - including HgA1c and Lipid panel.\par \par Patient was advised about healthy lifestyle changes, including diet and exercise. Importance of sustained long-term weight loss was discussed, questions answered.\par \par \par F/u after the monitor.\par

## 2023-05-17 ENCOUNTER — APPOINTMENT (OUTPATIENT)
Dept: VASCULAR SURGERY | Facility: CLINIC | Age: 68
End: 2023-05-17
Payer: COMMERCIAL

## 2023-05-17 VITALS — SYSTOLIC BLOOD PRESSURE: 131 MMHG | DIASTOLIC BLOOD PRESSURE: 79 MMHG

## 2023-05-17 PROCEDURE — 99203 OFFICE O/P NEW LOW 30 MIN: CPT

## 2023-05-18 LAB
BUN SERPL-MCNC: 36 MG/DL
CREAT SERPL-MCNC: 1.5 MG/DL
EGFR: 50 ML/MIN/1.73M2

## 2023-06-05 ENCOUNTER — NON-APPOINTMENT (OUTPATIENT)
Age: 68
End: 2023-06-05

## 2023-06-06 ENCOUNTER — APPOINTMENT (OUTPATIENT)
Dept: CARDIOLOGY | Facility: CLINIC | Age: 68
End: 2023-06-06
Payer: COMMERCIAL

## 2023-06-06 VITALS
TEMPERATURE: 98 F | SYSTOLIC BLOOD PRESSURE: 122 MMHG | BODY MASS INDEX: 33.27 KG/M2 | DIASTOLIC BLOOD PRESSURE: 69 MMHG | HEIGHT: 66 IN | HEART RATE: 70 BPM | WEIGHT: 207 LBS

## 2023-06-06 VITALS — HEART RATE: 61 BPM

## 2023-06-06 DIAGNOSIS — R00.2 PALPITATIONS: ICD-10-CM

## 2023-06-06 PROCEDURE — 93000 ELECTROCARDIOGRAM COMPLETE: CPT

## 2023-06-06 PROCEDURE — 99214 OFFICE O/P EST MOD 30 MIN: CPT

## 2023-06-06 NOTE — ASSESSMENT
[FreeTextEntry1] : CAD by CT scan\par No surgical disease\par LAD lesion - negative nuclear MPI in anterior wall. No LAD ischemia. Mild lesion in LCX - small inferior wall defect.\par DM, DL, HTN\par C/w non-obstructive CAD\par Dizziness, bradycardia. He was offered ILR, but declined. Reviewed MCOT - no sustiend arrhythmia. Frequent PVC's.\par \par C/w medical therapy\par C/w ASA, statin. DM, Lipid control.\par Hold b-blocker\par C/w Nifedipine\par Repeat stress test in November 2022 - negative.\par \par \par Labs with PMD - including HgA1c and Lipid panel.\par \par F/u with vascular after the CTA of the neck.\par \par Patient was advised about healthy lifestyle changes, including diet and exercise. Importance of sustained long-term weight loss was discussed, questions answered.\par \par \par F/u in 6 months.\par

## 2023-06-06 NOTE — HISTORY OF PRESENT ILLNESS
[FreeTextEntry1] : 69 y/o male with history of HTN, DM, DL,  Post  [facility] ER  visit  on 11/2020 for  chest discomfort. CCTA was abnormal with LAD lesion, which was not well evaluated due to motion artifact. The patient also had a non-obstructive lesion in the LCX. RCA was small (non-dominant).  Had an episode of chest pain - last stress test in 2020. No palpitations. Clinically stable. Had dizziness again. Was in the hospital with bradycardia - stopped labetalol. Still with dizziness. LDL is controlled on last labs. Was diagnosed with carotid disease - awaiting CTA of the carotids. Has f/u with vascular surgery.\par \par Holter/MCOT - negative for sustained arrhythmia\par Nuclear MPI - small inferior defect.\par

## 2023-06-13 ENCOUNTER — LABORATORY RESULT (OUTPATIENT)
Age: 68
End: 2023-06-13

## 2023-06-15 LAB
BUN SERPL-MCNC: 33 MG/DL
CREAT SERPL-MCNC: 1.5 MG/DL
EGFR: 50 ML/MIN/1.73M2

## 2023-07-11 ENCOUNTER — OUTPATIENT (OUTPATIENT)
Dept: OUTPATIENT SERVICES | Facility: HOSPITAL | Age: 68
LOS: 1 days | End: 2023-07-11
Payer: COMMERCIAL

## 2023-07-11 DIAGNOSIS — Z00.8 ENCOUNTER FOR OTHER GENERAL EXAMINATION: ICD-10-CM

## 2023-07-11 DIAGNOSIS — I65.23 OCCLUSION AND STENOSIS OF BILATERAL CAROTID ARTERIES: ICD-10-CM

## 2023-07-11 DIAGNOSIS — Z98.42 CATARACT EXTRACTION STATUS, LEFT EYE: Chronic | ICD-10-CM

## 2023-07-11 PROCEDURE — 70498 CT ANGIOGRAPHY NECK: CPT

## 2023-07-11 PROCEDURE — 70498 CT ANGIOGRAPHY NECK: CPT | Mod: 26

## 2023-07-12 DIAGNOSIS — I65.23 OCCLUSION AND STENOSIS OF BILATERAL CAROTID ARTERIES: ICD-10-CM

## 2023-07-28 ENCOUNTER — NON-APPOINTMENT (OUTPATIENT)
Age: 68
End: 2023-07-28

## 2023-08-30 NOTE — ASSESSMENT
[FreeTextEntry1] : CAD by CT scan\par No surgical disease\par LAD lesion - negative nuclear MPI in anterior wall. No LAD ischemia. Mild lesion in LCX - small inferior wall defect.\par DM, DL, HTN\par \par In the absence of significant symptoms, would c/w medical therapy\par C/w ASA, statin. DM, Lipid control.\par \par Negative event monitor. C/w BP control.\par \par Labs with PMD - including HgA1c and Lipid panel.\par \par Patient was advised about healthy lifestyle changes, including diet and exercise. Importance of sustained long-term weight loss was discussed, questions answered.\par \par \par F/u in 12 months.\par 
[id number]

## 2023-09-12 ENCOUNTER — NON-APPOINTMENT (OUTPATIENT)
Age: 68
End: 2023-09-12

## 2023-09-17 ENCOUNTER — EMERGENCY (EMERGENCY)
Facility: HOSPITAL | Age: 68
LOS: 0 days | Discharge: HOME | End: 2023-09-17
Attending: EMERGENCY MEDICINE
Payer: COMMERCIAL

## 2023-09-17 VITALS
DIASTOLIC BLOOD PRESSURE: 64 MMHG | OXYGEN SATURATION: 95 % | SYSTOLIC BLOOD PRESSURE: 137 MMHG | HEART RATE: 98 BPM | TEMPERATURE: 98 F | RESPIRATION RATE: 19 BRPM

## 2023-09-17 VITALS
HEART RATE: 102 BPM | HEIGHT: 66 IN | TEMPERATURE: 98 F | DIASTOLIC BLOOD PRESSURE: 72 MMHG | WEIGHT: 207.01 LBS | SYSTOLIC BLOOD PRESSURE: 165 MMHG | OXYGEN SATURATION: 97 % | RESPIRATION RATE: 20 BRPM

## 2023-09-17 DIAGNOSIS — Z98.42 CATARACT EXTRACTION STATUS, LEFT EYE: Chronic | ICD-10-CM

## 2023-09-17 DIAGNOSIS — R60.0 LOCALIZED EDEMA: ICD-10-CM

## 2023-09-17 DIAGNOSIS — Z86.73 PERSONAL HISTORY OF TRANSIENT ISCHEMIC ATTACK (TIA), AND CEREBRAL INFARCTION WITHOUT RESIDUAL DEFICITS: ICD-10-CM

## 2023-09-17 DIAGNOSIS — M79.89 OTHER SPECIFIED SOFT TISSUE DISORDERS: ICD-10-CM

## 2023-09-17 DIAGNOSIS — E78.5 HYPERLIPIDEMIA, UNSPECIFIED: ICD-10-CM

## 2023-09-17 DIAGNOSIS — R79.89 OTHER SPECIFIED ABNORMAL FINDINGS OF BLOOD CHEMISTRY: ICD-10-CM

## 2023-09-17 DIAGNOSIS — Z79.84 LONG TERM (CURRENT) USE OF ORAL HYPOGLYCEMIC DRUGS: ICD-10-CM

## 2023-09-17 DIAGNOSIS — E11.22 TYPE 2 DIABETES MELLITUS WITH DIABETIC CHRONIC KIDNEY DISEASE: ICD-10-CM

## 2023-09-17 DIAGNOSIS — I12.9 HYPERTENSIVE CHRONIC KIDNEY DISEASE WITH STAGE 1 THROUGH STAGE 4 CHRONIC KIDNEY DISEASE, OR UNSPECIFIED CHRONIC KIDNEY DISEASE: ICD-10-CM

## 2023-09-17 DIAGNOSIS — Z79.82 LONG TERM (CURRENT) USE OF ASPIRIN: ICD-10-CM

## 2023-09-17 DIAGNOSIS — N18.9 CHRONIC KIDNEY DISEASE, UNSPECIFIED: ICD-10-CM

## 2023-09-17 LAB
ALBUMIN SERPL ELPH-MCNC: 4.1 G/DL — SIGNIFICANT CHANGE UP (ref 3.5–5.2)
ALP SERPL-CCNC: 27 U/L — LOW (ref 30–115)
ALT FLD-CCNC: 7 U/L — SIGNIFICANT CHANGE UP (ref 0–41)
ANION GAP SERPL CALC-SCNC: 13 MMOL/L — SIGNIFICANT CHANGE UP (ref 7–14)
APTT BLD: 31.9 SEC — SIGNIFICANT CHANGE UP (ref 27–39.2)
AST SERPL-CCNC: 17 U/L — SIGNIFICANT CHANGE UP (ref 0–41)
BASOPHILS # BLD AUTO: 0.08 K/UL — SIGNIFICANT CHANGE UP (ref 0–0.2)
BASOPHILS NFR BLD AUTO: 0.7 % — SIGNIFICANT CHANGE UP (ref 0–1)
BILIRUB SERPL-MCNC: 0.7 MG/DL — SIGNIFICANT CHANGE UP (ref 0.2–1.2)
BUN SERPL-MCNC: 29 MG/DL — HIGH (ref 10–20)
CALCIUM SERPL-MCNC: 9.9 MG/DL — SIGNIFICANT CHANGE UP (ref 8.4–10.4)
CHLORIDE SERPL-SCNC: 102 MMOL/L — SIGNIFICANT CHANGE UP (ref 98–110)
CO2 SERPL-SCNC: 21 MMOL/L — SIGNIFICANT CHANGE UP (ref 17–32)
CREAT SERPL-MCNC: 1.5 MG/DL — SIGNIFICANT CHANGE UP (ref 0.7–1.5)
EGFR: 50 ML/MIN/1.73M2 — LOW
EOSINOPHIL # BLD AUTO: 0.1 K/UL — SIGNIFICANT CHANGE UP (ref 0–0.7)
EOSINOPHIL NFR BLD AUTO: 0.9 % — SIGNIFICANT CHANGE UP (ref 0–8)
GLUCOSE SERPL-MCNC: 156 MG/DL — HIGH (ref 70–99)
HCT VFR BLD CALC: 34.8 % — LOW (ref 42–52)
HGB BLD-MCNC: 11.5 G/DL — LOW (ref 14–18)
IMM GRANULOCYTES NFR BLD AUTO: 0.5 % — HIGH (ref 0.1–0.3)
INR BLD: 1.18 RATIO — SIGNIFICANT CHANGE UP (ref 0.65–1.3)
LYMPHOCYTES # BLD AUTO: 1.02 K/UL — LOW (ref 1.2–3.4)
LYMPHOCYTES # BLD AUTO: 9.4 % — LOW (ref 20.5–51.1)
MAGNESIUM SERPL-MCNC: 1.7 MG/DL — LOW (ref 1.8–2.4)
MCHC RBC-ENTMCNC: 27.4 PG — SIGNIFICANT CHANGE UP (ref 27–31)
MCHC RBC-ENTMCNC: 33 G/DL — SIGNIFICANT CHANGE UP (ref 32–37)
MCV RBC AUTO: 82.9 FL — SIGNIFICANT CHANGE UP (ref 80–94)
MONOCYTES # BLD AUTO: 0.9 K/UL — HIGH (ref 0.1–0.6)
MONOCYTES NFR BLD AUTO: 8.3 % — SIGNIFICANT CHANGE UP (ref 1.7–9.3)
NEUTROPHILS # BLD AUTO: 8.66 K/UL — HIGH (ref 1.4–6.5)
NEUTROPHILS NFR BLD AUTO: 80.2 % — HIGH (ref 42.2–75.2)
NRBC # BLD: 0 /100 WBCS — SIGNIFICANT CHANGE UP (ref 0–0)
NT-PROBNP SERPL-SCNC: 1577 PG/ML — HIGH (ref 0–300)
PLATELET # BLD AUTO: 337 K/UL — SIGNIFICANT CHANGE UP (ref 130–400)
POTASSIUM SERPL-MCNC: 3.9 MMOL/L — SIGNIFICANT CHANGE UP (ref 3.5–5)
POTASSIUM SERPL-SCNC: 3.9 MMOL/L — SIGNIFICANT CHANGE UP (ref 3.5–5)
PROT SERPL-MCNC: 7.2 G/DL — SIGNIFICANT CHANGE UP (ref 6–8)
PROTHROM AB SERPL-ACNC: 13.5 SEC — HIGH (ref 9.95–12.87)
RBC # BLD: 4.2 M/UL — LOW (ref 4.7–6.1)
RBC # FLD: 14.3 % — SIGNIFICANT CHANGE UP (ref 11.5–14.5)
SODIUM SERPL-SCNC: 136 MMOL/L — SIGNIFICANT CHANGE UP (ref 135–146)
TROPONIN T SERPL-MCNC: <0.01 NG/ML — SIGNIFICANT CHANGE UP
WBC # BLD: 10.81 K/UL — HIGH (ref 4.8–10.8)
WBC # FLD AUTO: 10.81 K/UL — HIGH (ref 4.8–10.8)

## 2023-09-17 PROCEDURE — 84484 ASSAY OF TROPONIN QUANT: CPT

## 2023-09-17 PROCEDURE — 99285 EMERGENCY DEPT VISIT HI MDM: CPT | Mod: 25

## 2023-09-17 PROCEDURE — 85730 THROMBOPLASTIN TIME PARTIAL: CPT

## 2023-09-17 PROCEDURE — 71045 X-RAY EXAM CHEST 1 VIEW: CPT

## 2023-09-17 PROCEDURE — 85610 PROTHROMBIN TIME: CPT

## 2023-09-17 PROCEDURE — 93005 ELECTROCARDIOGRAM TRACING: CPT

## 2023-09-17 PROCEDURE — 71045 X-RAY EXAM CHEST 1 VIEW: CPT | Mod: 26

## 2023-09-17 PROCEDURE — 85025 COMPLETE CBC W/AUTO DIFF WBC: CPT

## 2023-09-17 PROCEDURE — 93970 EXTREMITY STUDY: CPT

## 2023-09-17 PROCEDURE — 83735 ASSAY OF MAGNESIUM: CPT

## 2023-09-17 PROCEDURE — 99285 EMERGENCY DEPT VISIT HI MDM: CPT

## 2023-09-17 PROCEDURE — 93970 EXTREMITY STUDY: CPT | Mod: 26

## 2023-09-17 PROCEDURE — 83880 ASSAY OF NATRIURETIC PEPTIDE: CPT

## 2023-09-17 PROCEDURE — 36415 COLL VENOUS BLD VENIPUNCTURE: CPT

## 2023-09-17 PROCEDURE — 93010 ELECTROCARDIOGRAM REPORT: CPT

## 2023-09-17 PROCEDURE — 80053 COMPREHEN METABOLIC PANEL: CPT

## 2023-09-17 RX ADMIN — Medication 5 MILLIGRAM(S): at 11:22

## 2023-09-17 NOTE — ED PROVIDER NOTE - CARE PROVIDER_API CALL
Bib Edwards  Interventional Cardiology  [address] [place] [address], [address]  [place] [address]  Phone: [phone]  Fax: [phone]  Established Patient  Follow Up Time: 1-3 Days

## 2023-09-17 NOTE — ED PROVIDER NOTE - OBJECTIVE STATEMENT
68 yold male to Ed Pmhx DM, Htn Hld, ckd recently tested positive for covid 4 days ago now presents to Ed c/o bilat lower ext edema, cough, low grade fever and exertional dyspnea; pt utd with vaccines x 3; pt denies chest pain, n/v back or abdominal pain; pt has pulse Ox at home and never goes <90% with exertion;

## 2023-09-17 NOTE — ED PROVIDER NOTE - PROGRESS NOTE DETAILS
Patient remained stable in ED, signed out to Dr. Giles at 7 am during shift change. ORCKY: Signout received pending ultrasound.  Discussed with vascular, preliminary duplex was negative.  Patient is ambulatory in ED, not hypoxic and oxygen saturation does not drop with ambulation.  Discussed at length with patient regarding leg swelling, elevated BNP.  States he follows with Dr. Edwards of cardiology and will call tomorrow to make an appointment this week.  Supportive care and strict return precautions advised.  Patient is comfortable with plan    Patient to be discharged from ED. Any available test results were discussed with patient and/or family. Verbal instructions given, including instructions to return to ED immediately for any new, worsening, or concerning symptoms. Patient endorsed understanding. Written discharge instructions additionally given, including follow-up plan.

## 2023-09-17 NOTE — ED PROVIDER NOTE - PHYSICAL EXAMINATION
Constitutional: Well developed, well nourished. NAD  Head: Normocephalic, atraumatic.  Eyes: PERRL, EOMI.  ENT: No nasal discharge. Mucous membranes dry.  Neck: Supple. Painless ROM.  Cardiovascular: Regular rate and rhythm.    Pulmonary:  Lungs clear to auscultation bilaterally.    Abdominal: Soft. Nondistended. No rebound, guarding, rigidity.  Extremities. Pelvis stable. + bilat +1 edema; no calf tenderness  Skin: No rashes, cyanosis.  Neuro: AAOx3. No focal neurological deficits.  Psych: Normal mood. Normal affect.

## 2023-09-17 NOTE — ED PROVIDER NOTE - PATIENT PORTAL LINK FT
You can access the FollowMyHealth Patient Portal offered by [facility] by registering at the following website: [email or web address]. By joining [facility]’s FollowMyHealth portal, you will also be able to view your health information using other applications (apps) compatible with our system.

## 2023-09-17 NOTE — ED PROVIDER NOTE - NS ED ATTENDING STATEMENT MOD
This was a shared visit with the LIAN. I reviewed and verified the documentation and independently performed the documented:

## 2023-09-17 NOTE — ED PROVIDER NOTE - CLINICAL SUMMARY MEDICAL DECISION MAKING FREE TEXT BOX
Patient with bilateral lower extremity swelling, cough and low-grade fever.  Patient also states that he occasionally gets dyspneic with exertion.  On arrival to ED, patient afebrile, hemodynamically stable, but appears slightly edematous on exam.  EKG obtained and grossly nonischemic.  Obtained labs which were grossly unremarkable including no significant leukocytosis, anemia, signs of dehydration/CANDICE, transaminitis or significant electrolyte abnormalities.  Troponin negative.  proBNP noted to be around 1500, however chest x-ray without evidence of fluid overload or any other emergent pathologies.  DVT study obtained and negative.  Patient ambulatory in ED without desaturation, no shortness of breath.  Given the above, will discharge home with outpatient follow up. Patient agreeable with plan. Agrees to return to ED for any new or worsening symptoms.

## 2023-09-17 NOTE — ED ADULT NURSE NOTE - NSFALLHARMRISKINTERV_ED_ALL_ED

## 2023-09-17 NOTE — ED PROVIDER NOTE - CARE PLAN
1 Principal Discharge DX:	Swelling of lower extremity  Secondary Diagnosis:	Elevated brain natriuretic peptide (BNP) level

## 2023-09-17 NOTE — ED PROVIDER NOTE - DIFFERENTIAL DIAGNOSIS
Differential Diagnosis LE swelling r/o DVT vs fluid overload. No infectious signs on exam, compartments soft and therefore no concern for compartment syndrome.

## 2023-09-18 ENCOUNTER — TRANSCRIPTION ENCOUNTER (OUTPATIENT)
Age: 68
End: 2023-09-18

## 2023-09-20 ENCOUNTER — RESULT CHARGE (OUTPATIENT)
Age: 68
End: 2023-09-20

## 2023-09-20 ENCOUNTER — APPOINTMENT (OUTPATIENT)
Dept: CARDIOLOGY | Facility: CLINIC | Age: 68
End: 2023-09-20
Payer: COMMERCIAL

## 2023-09-20 VITALS
HEIGHT: 66 IN | WEIGHT: 210 LBS | BODY MASS INDEX: 33.75 KG/M2 | OXYGEN SATURATION: 97 % | DIASTOLIC BLOOD PRESSURE: 60 MMHG | TEMPERATURE: 97 F | SYSTOLIC BLOOD PRESSURE: 130 MMHG | HEART RATE: 78 BPM | RESPIRATION RATE: 18 BRPM

## 2023-09-20 PROCEDURE — 93000 ELECTROCARDIOGRAM COMPLETE: CPT

## 2023-09-20 PROCEDURE — 99214 OFFICE O/P EST MOD 30 MIN: CPT | Mod: 25

## 2023-09-21 ENCOUNTER — INPATIENT (INPATIENT)
Facility: HOSPITAL | Age: 68
LOS: 1 days | Discharge: HOME | DRG: 291 | End: 2023-09-23
Attending: INTERNAL MEDICINE | Admitting: INTERNAL MEDICINE
Payer: COMMERCIAL

## 2023-09-21 VITALS
WEIGHT: 216.93 LBS | TEMPERATURE: 98 F | SYSTOLIC BLOOD PRESSURE: 164 MMHG | OXYGEN SATURATION: 98 % | HEIGHT: 66 IN | HEART RATE: 90 BPM | DIASTOLIC BLOOD PRESSURE: 70 MMHG | RESPIRATION RATE: 16 BRPM

## 2023-09-21 DIAGNOSIS — Z98.42 CATARACT EXTRACTION STATUS, LEFT EYE: Chronic | ICD-10-CM

## 2023-09-21 DIAGNOSIS — E87.70 FLUID OVERLOAD, UNSPECIFIED: ICD-10-CM

## 2023-09-21 LAB
ALBUMIN SERPL ELPH-MCNC: 4 G/DL — SIGNIFICANT CHANGE UP (ref 3.5–5.2)
ALP SERPL-CCNC: 26 U/L — LOW (ref 30–115)
ALT FLD-CCNC: 8 U/L — SIGNIFICANT CHANGE UP (ref 0–41)
ANION GAP SERPL CALC-SCNC: 17 MMOL/L — HIGH (ref 7–14)
ANION GAP SERPL CALC-SCNC: 19 MMOL/L — HIGH (ref 7–14)
AST SERPL-CCNC: 22 U/L — SIGNIFICANT CHANGE UP (ref 0–41)
BASOPHILS # BLD AUTO: 0.08 K/UL — SIGNIFICANT CHANGE UP (ref 0–0.2)
BASOPHILS NFR BLD AUTO: 1 % — SIGNIFICANT CHANGE UP (ref 0–1)
BILIRUB SERPL-MCNC: 0.5 MG/DL — SIGNIFICANT CHANGE UP (ref 0.2–1.2)
BUN SERPL-MCNC: 32 MG/DL — HIGH (ref 10–20)
BUN SERPL-MCNC: 33 MG/DL — HIGH (ref 10–20)
CALCIUM SERPL-MCNC: 9.5 MG/DL — SIGNIFICANT CHANGE UP (ref 8.4–10.5)
CALCIUM SERPL-MCNC: 9.5 MG/DL — SIGNIFICANT CHANGE UP (ref 8.4–10.5)
CHLORIDE SERPL-SCNC: 89 MMOL/L — LOW (ref 98–110)
CHLORIDE SERPL-SCNC: 92 MMOL/L — LOW (ref 98–110)
CO2 SERPL-SCNC: 17 MMOL/L — SIGNIFICANT CHANGE UP (ref 17–32)
CO2 SERPL-SCNC: 19 MMOL/L — SIGNIFICANT CHANGE UP (ref 17–32)
CREAT SERPL-MCNC: 1.5 MG/DL — SIGNIFICANT CHANGE UP (ref 0.7–1.5)
CREAT SERPL-MCNC: 1.6 MG/DL — HIGH (ref 0.7–1.5)
EGFR: 47 ML/MIN/1.73M2 — LOW
EGFR: 50 ML/MIN/1.73M2 — LOW
EOSINOPHIL # BLD AUTO: 0.38 K/UL — SIGNIFICANT CHANGE UP (ref 0–0.7)
EOSINOPHIL NFR BLD AUTO: 4.8 % — SIGNIFICANT CHANGE UP (ref 0–8)
GLUCOSE BLDC GLUCOMTR-MCNC: 116 MG/DL — HIGH (ref 70–99)
GLUCOSE BLDC GLUCOMTR-MCNC: 138 MG/DL — HIGH (ref 70–99)
GLUCOSE BLDC GLUCOMTR-MCNC: 151 MG/DL — HIGH (ref 70–99)
GLUCOSE SERPL-MCNC: 129 MG/DL — HIGH (ref 70–99)
GLUCOSE SERPL-MCNC: 138 MG/DL — HIGH (ref 70–99)
HCT VFR BLD CALC: 30.6 % — LOW (ref 42–52)
HGB BLD-MCNC: 10.3 G/DL — LOW (ref 14–18)
IMM GRANULOCYTES NFR BLD AUTO: 0.5 % — HIGH (ref 0.1–0.3)
LYMPHOCYTES # BLD AUTO: 1.26 K/UL — SIGNIFICANT CHANGE UP (ref 1.2–3.4)
LYMPHOCYTES # BLD AUTO: 15.8 % — LOW (ref 20.5–51.1)
MCHC RBC-ENTMCNC: 26.7 PG — LOW (ref 27–31)
MCHC RBC-ENTMCNC: 33.7 G/DL — SIGNIFICANT CHANGE UP (ref 32–37)
MCV RBC AUTO: 79.3 FL — LOW (ref 80–94)
MONOCYTES # BLD AUTO: 0.92 K/UL — HIGH (ref 0.1–0.6)
MONOCYTES NFR BLD AUTO: 11.5 % — HIGH (ref 1.7–9.3)
NEUTROPHILS # BLD AUTO: 5.3 K/UL — SIGNIFICANT CHANGE UP (ref 1.4–6.5)
NEUTROPHILS NFR BLD AUTO: 66.4 % — SIGNIFICANT CHANGE UP (ref 42.2–75.2)
NRBC # BLD: 0 /100 WBCS — SIGNIFICANT CHANGE UP (ref 0–0)
NT-PROBNP SERPL-SCNC: 797 PG/ML — HIGH (ref 0–300)
PLATELET # BLD AUTO: 450 K/UL — HIGH (ref 130–400)
PMV BLD: 9.6 FL — SIGNIFICANT CHANGE UP (ref 7.4–10.4)
POTASSIUM SERPL-MCNC: 3.4 MMOL/L — LOW (ref 3.5–5)
POTASSIUM SERPL-MCNC: 3.5 MMOL/L — SIGNIFICANT CHANGE UP (ref 3.5–5)
POTASSIUM SERPL-SCNC: 3.4 MMOL/L — LOW (ref 3.5–5)
POTASSIUM SERPL-SCNC: 3.5 MMOL/L — SIGNIFICANT CHANGE UP (ref 3.5–5)
PROT SERPL-MCNC: 6.7 G/DL — SIGNIFICANT CHANGE UP (ref 6–8)
RBC # BLD: 3.86 M/UL — LOW (ref 4.7–6.1)
RBC # FLD: 13.1 % — SIGNIFICANT CHANGE UP (ref 11.5–14.5)
SODIUM SERPL-SCNC: 125 MMOL/L — LOW (ref 135–146)
SODIUM SERPL-SCNC: 128 MMOL/L — LOW (ref 135–146)
TROPONIN T SERPL-MCNC: <0.01 NG/ML — SIGNIFICANT CHANGE UP
WBC # BLD: 7.98 K/UL — SIGNIFICANT CHANGE UP (ref 4.8–10.8)
WBC # FLD AUTO: 7.98 K/UL — SIGNIFICANT CHANGE UP (ref 4.8–10.8)

## 2023-09-21 PROCEDURE — 83036 HEMOGLOBIN GLYCOSYLATED A1C: CPT

## 2023-09-21 PROCEDURE — 99223 1ST HOSP IP/OBS HIGH 75: CPT

## 2023-09-21 PROCEDURE — 93306 TTE W/DOPPLER COMPLETE: CPT

## 2023-09-21 PROCEDURE — 80048 BASIC METABOLIC PNL TOTAL CA: CPT

## 2023-09-21 PROCEDURE — 71045 X-RAY EXAM CHEST 1 VIEW: CPT | Mod: 26

## 2023-09-21 PROCEDURE — 74177 CT ABD & PELVIS W/CONTRAST: CPT | Mod: 26,MA

## 2023-09-21 PROCEDURE — 93005 ELECTROCARDIOGRAM TRACING: CPT

## 2023-09-21 PROCEDURE — 80053 COMPREHEN METABOLIC PANEL: CPT

## 2023-09-21 PROCEDURE — 99285 EMERGENCY DEPT VISIT HI MDM: CPT

## 2023-09-21 PROCEDURE — 82962 GLUCOSE BLOOD TEST: CPT

## 2023-09-21 PROCEDURE — 84443 ASSAY THYROID STIM HORMONE: CPT

## 2023-09-21 PROCEDURE — 36415 COLL VENOUS BLD VENIPUNCTURE: CPT

## 2023-09-21 PROCEDURE — 85025 COMPLETE CBC W/AUTO DIFF WBC: CPT

## 2023-09-21 PROCEDURE — 81003 URINALYSIS AUTO W/O SCOPE: CPT

## 2023-09-21 PROCEDURE — 83735 ASSAY OF MAGNESIUM: CPT

## 2023-09-21 PROCEDURE — 84484 ASSAY OF TROPONIN QUANT: CPT

## 2023-09-21 RX ORDER — ENOXAPARIN SODIUM 100 MG/ML
40 INJECTION SUBCUTANEOUS EVERY 24 HOURS
Refills: 0 | Status: DISCONTINUED | OUTPATIENT
Start: 2023-09-21 | End: 2023-09-23

## 2023-09-21 RX ORDER — DEXTROSE 50 % IN WATER 50 %
12.5 SYRINGE (ML) INTRAVENOUS ONCE
Refills: 0 | Status: DISCONTINUED | OUTPATIENT
Start: 2023-09-21 | End: 2023-09-23

## 2023-09-21 RX ORDER — SODIUM CHLORIDE 9 MG/ML
1000 INJECTION, SOLUTION INTRAVENOUS
Refills: 0 | Status: DISCONTINUED | OUTPATIENT
Start: 2023-09-21 | End: 2023-09-23

## 2023-09-21 RX ORDER — FENOFIBRATE,MICRONIZED 130 MG
145 CAPSULE ORAL DAILY
Refills: 0 | Status: DISCONTINUED | OUTPATIENT
Start: 2023-09-21 | End: 2023-09-23

## 2023-09-21 RX ORDER — DEXTROSE 50 % IN WATER 50 %
15 SYRINGE (ML) INTRAVENOUS ONCE
Refills: 0 | Status: DISCONTINUED | OUTPATIENT
Start: 2023-09-21 | End: 2023-09-23

## 2023-09-21 RX ORDER — PANTOPRAZOLE SODIUM 20 MG/1
40 TABLET, DELAYED RELEASE ORAL
Refills: 0 | Status: DISCONTINUED | OUTPATIENT
Start: 2023-09-21 | End: 2023-09-23

## 2023-09-21 RX ORDER — FUROSEMIDE 40 MG
40 TABLET ORAL ONCE
Refills: 0 | Status: COMPLETED | OUTPATIENT
Start: 2023-09-21 | End: 2023-09-21

## 2023-09-21 RX ORDER — SENNA PLUS 8.6 MG/1
2 TABLET ORAL AT BEDTIME
Refills: 0 | Status: DISCONTINUED | OUTPATIENT
Start: 2023-09-22 | End: 2023-09-23

## 2023-09-21 RX ORDER — SENNA PLUS 8.6 MG/1
1 TABLET ORAL ONCE
Refills: 0 | Status: COMPLETED | OUTPATIENT
Start: 2023-09-21 | End: 2023-09-21

## 2023-09-21 RX ORDER — ATORVASTATIN CALCIUM 80 MG/1
20 TABLET, FILM COATED ORAL AT BEDTIME
Refills: 0 | Status: DISCONTINUED | OUTPATIENT
Start: 2023-09-21 | End: 2023-09-23

## 2023-09-21 RX ORDER — LOSARTAN POTASSIUM 100 MG/1
100 TABLET, FILM COATED ORAL DAILY
Refills: 0 | Status: DISCONTINUED | OUTPATIENT
Start: 2023-09-21 | End: 2023-09-21

## 2023-09-21 RX ORDER — GLUCAGON INJECTION, SOLUTION 0.5 MG/.1ML
1 INJECTION, SOLUTION SUBCUTANEOUS ONCE
Refills: 0 | Status: DISCONTINUED | OUTPATIENT
Start: 2023-09-21 | End: 2023-09-23

## 2023-09-21 RX ORDER — ASPIRIN/CALCIUM CARB/MAGNESIUM 324 MG
81 TABLET ORAL DAILY
Refills: 0 | Status: DISCONTINUED | OUTPATIENT
Start: 2023-09-21 | End: 2023-09-23

## 2023-09-21 RX ORDER — NIFEDIPINE 30 MG
60 TABLET, EXTENDED RELEASE 24 HR ORAL DAILY
Refills: 0 | Status: DISCONTINUED | OUTPATIENT
Start: 2023-09-21 | End: 2023-09-23

## 2023-09-21 RX ORDER — POLYETHYLENE GLYCOL 3350 17 G/17G
17 POWDER, FOR SOLUTION ORAL DAILY
Refills: 0 | Status: DISCONTINUED | OUTPATIENT
Start: 2023-09-22 | End: 2023-09-23

## 2023-09-21 RX ORDER — DEXTROSE 50 % IN WATER 50 %
25 SYRINGE (ML) INTRAVENOUS ONCE
Refills: 0 | Status: DISCONTINUED | OUTPATIENT
Start: 2023-09-21 | End: 2023-09-23

## 2023-09-21 RX ORDER — FUROSEMIDE 40 MG
40 TABLET ORAL DAILY
Refills: 0 | Status: DISCONTINUED | OUTPATIENT
Start: 2023-09-22 | End: 2023-09-23

## 2023-09-21 RX ORDER — INSULIN LISPRO 100/ML
VIAL (ML) SUBCUTANEOUS
Refills: 0 | Status: DISCONTINUED | OUTPATIENT
Start: 2023-09-21 | End: 2023-09-23

## 2023-09-21 RX ADMIN — Medication 40 MILLIGRAM(S): at 06:35

## 2023-09-21 RX ADMIN — SENNA PLUS 1 TABLET(S): 8.6 TABLET ORAL at 12:29

## 2023-09-21 NOTE — H&P ADULT - HISTORY OF PRESENT ILLNESS
This is a 60-year-old male history of HLD CAD type 2 diabetes hypertension CVA who presents to the ED with bilateral lower extremity edema, acutely worsening over the last week. Patient recently presented to the ED for a similar complaint and was discharged, and instructed to follow up with Cardiology. He was started on diuretics outpatient. He also complaints of constipation with last BM 7 days ago, associated with abdominal pain. Patient denies chest pain, fever, SOB, or chills.     In the ED, patient is hemodynamically stable and afebrile. Labs were significant for Hgb 10.3, Na 125, BUN/Cr 32/1.6,  and trop negative x 1. CT abdomen and pelvis mild circumferential rectal wall thickening, and small bilateral pleural effusions and bibasilar atelectasis. Xray negative for acute pathologies. He was given furosemide IV 40 x1 and admitted to medicine for further management.  This is a 60-year-old male history of HLD CAD type 2 diabetes hypertension CVA who presents to the ED with bilateral lower extremity edema, acutely worsening over the last week. Patient recently presented to the ED for a similar complaint and was discharged, and instructed to follow up with Cardiology. He was started on diuretics outpatient. He also complaints of constipation with last BM 7 days ago, associated with abdominal pain. Patient denies chest pain, fever, SOB, or chills. Patient has now had a BM and endorses improvement in abdominal pain.     In the ED, patient is hemodynamically stable and afebrile. Labs were significant for Hgb 10.3, Na 125, BUN/Cr 32/1.6,  and trop negative x 1. CT abdomen and pelvis mild circumferential rectal wall thickening, and small bilateral pleural effusions and bibasilar atelectasis. Xray negative for acute pathologies. He was given furosemide IV 40 x1 and admitted to medicine for further management.  This is a 68-year-old male history of HLD CAD type 2 diabetes hypertension CVA who presents to the ED with bilateral lower extremity edema, acutely worsening over the last week. Patient recently presented to the ED for a similar complaint and was discharged, and instructed to follow up with Cardiology. He was started on diuretics outpatient. He also complaints of constipation with last BM 7 days ago, associated with abdominal pain. Patient denies chest pain, fever, SOB, or chills. Patient has now had a BM and endorses improvement in abdominal pain.     In the ED, patient is hemodynamically stable and afebrile. Labs were significant for Hgb 10.3, Na 125, BUN/Cr 32/1.6,  and trop negative x 1. CT abdomen and pelvis mild circumferential rectal wall thickening, and small bilateral pleural effusions and bibasilar atelectasis. Xray negative for acute pathologies. He was given furosemide IV 40 x1 and admitted to medicine for further management.

## 2023-09-21 NOTE — ED PROVIDER NOTE - CLINICAL SUMMARY MEDICAL DECISION MAKING FREE TEXT BOX
Patient presenting for worsening lower extremity edema, recently started diuretic, pending echo by Dr. Edwards. b/l LE pitting edema on exam. no abdominal tenderness on reassessment. Labs and EKG were ordered and reviewed.  Imaging was ordered and reviewed by me.   Escalation to admission/observation was considered.  Patient requires inpatient hospitalization.

## 2023-09-21 NOTE — ED ADULT NURSE NOTE - OBJECTIVE STATEMENT
reports nausea, generalized abdominal pain, vomiting x 6 hrs after eating fast food
Pt c/o abdominal pain and bilateral swelling in both legs x 5 days. Pt states he is also constipated and has tried taking stool softeners with no relief. Pt denies fever, N/V/D.

## 2023-09-21 NOTE — H&P ADULT - NSHPLABSRESULTS_GEN_ALL_CORE
Labs:                        10.3   7.98  )-----------( 450      ( 21 Sep 2023 06:35 )             30.6     09-21    125<L>  |  89<L>  |  32<H>  ----------------------------<  129<H>  3.5   |  17  |  1.6<H>    Ca    9.5      21 Sep 2023 06:35    TPro  6.7  /  Alb  4.0  /  TBili  0.5  /  DBili  x   /  AST  22  /  ALT  8   /  AlkPhos  26<L>  09-21      Creatinine: 1.6 mg/dL (09-21-23 @ 06:35)  Creatinine: 1.5 mg/dL (09-17-23 @ 06:12)    CT abdomen and pelvis IMPRESSION:  1.  Mild circumferential rectal wall thickening, may be secondary to   infectious/inflammatory proctitis versus underdistention. Correlate with   symptoms.  2.  Otherwise, no CT evidence of an acute abdominopelvic pathology.  3.  Small bilateral pleural effusions and bibasilar atelectasis.            WBC Count: 7.98 K/uL (09-21-23 @ 06:35)  WBC Count: 10.81 K/uL (09-17-23 @ 06:12)        Alkaline Phosphatase: 26 U/L (09-21-23 @ 06:35)  Alanine Aminotransferase (ALT/SGPT): 8 U/L (09-21-23 @ 06:35)  Aspartate Aminotransferase (AST/SGOT): 22 U/L (09-21-23 @ 06:35)  Bilirubin Total: 0.5 mg/dL (09-21-23 @ 06:35)

## 2023-09-21 NOTE — H&P ADULT - NSHPPHYSICALEXAM_GEN_ALL_CORE
Vitals:  ============  T(F): 97.8 (21 Sep 2023 08:18), Max: 97.8 (21 Sep 2023 08:18)  HR: 91 (21 Sep 2023 08:18)  BP: 133/61 (21 Sep 2023 08:18)  RR: 18 (21 Sep 2023 08:18)  SpO2: 98% (21 Sep 2023 08:18) (98% - 98%)  temp max in last 48H T(F): , Max: 97.8 (09-21-23 @ 08:18)    GENERAL: NAD, lying in bed comfortably  HEAD:  Atraumatic, normocephalic  EYES: EOMI, PERRLA, conjunctiva and sclera clear  HEART: Regular rate and rhythm, no murmurs, rubs, or gallops  LUNGS: Unlabored respirations.  Clear to auscultation bilaterally, no crackles, wheezing, or rhonchi  ABDOMEN: Soft, nontender, distended  EXTREMITIES: 2+ pitting edema bilaterally  NERVOUS SYSTEM:  A&Ox3, moving all extremities, no focal deficits   SKIN: No rashes or lesions

## 2023-09-21 NOTE — ED ADULT TRIAGE NOTE - HEART RATE (BEATS/MIN)
MRN:[id number]                      After Visit Summary   9/7/2017    Franklin Mejia    MRN: [id number]           Visit Information        Provider Department      9/7/2017 1:00 PM Sugar Cherri CANELA [facility] Generic      Your next 10 appointments already scheduled     Sep 28, 2017  3:00 PM CDT   Return Visit with DOC Cooper   [facility] ([facility])    [address] [id number] [phone]            Oct 24, 2017  1:00 PM CDT   Return Visit with DOC Cooper   [facility] ([facility])    [address] [facility] [address]  [facility] [address] [phone]              MyChart Information     [facility] gives you secure access to your electronic health record. If you see a primary care provider, you can also send messages to your care team and make appointments. If you have questions, please call your primary care clinic.  If you do not have a primary care provider, please call [phone] and they will assist you.        Care EveryWhere ID     This is your Care EveryWhere ID. This could be used by other organizations to access your [facility] medical records  [id number]        Equal Access to Services     GAEL SILVER : Mino jimenezo Soanuel, waaxda luqadaha, qaybta kaalmada adeegdustinda, june anaya. So [facility] [phone].    ATENCIÓN: Si habla español, tiene a bajwa disposición servicios gratuitos de asistencia lingüística. Llame al [phone].    We comply with applicable federal civil rights laws and [place] laws. We do not discriminate on the basis of race, color, national origin, age, disability sex, sexual orientation or gender identity.            
90

## 2023-09-21 NOTE — ED PROVIDER NOTE - CARE PLAN
Detail Level: Detailed Quality 130: Documentation Of Current Medications In The Medical Record: Current Medications Documented Quality 110: Preventive Care And Screening: Influenza Immunization: Influenza Immunization not Administered because Patient Refused. Assessment and plan of treatment:	Abdominal pain: Labs and CT scan    Volume overload: Labs EKG chest x-ray Lasix   1 Principal Discharge DX:	Fluid overload  Assessment and plan of treatment:	Abdominal pain: Labs and CT scan    Volume overload: Labs EKG chest x-ray Lasix  Secondary Diagnosis:	Hyponatremia  Secondary Diagnosis:	Constipation

## 2023-09-21 NOTE — H&P ADULT - ATTENDING COMMENTS
Patient was seen independently. Latest vital signs and labs  & imaging, EKG etc were reviewed. Case was discussed with house staff. Agree with resident's assessment and plan with following additions/modifications.     Patient denies any headache, any vision complaints, runny nose, fever, chills, sore throat. Denies chest pain,, palpitation. Denies , abdominal pain, diarrhoea, Denies urinary burning, urgency, frequency, dysuria. At least 10 systems were reviewed in ROS. All systems reviewed  are within normal limits except for the complaints as described in Subjective.         Not in acute distress, obese  General: No pallor, no icterus, afebrile  HEENT: No JVD, no Bruit.  Heart: S1+S2 audible  Lungs: bilateral  fair air entry, no wheezing, no crepitations.  Abdomen: Soft, non-tender,not distended , no  rigidity or guarding.  CNS: Grossly intact, sensations intact.  Extremities:  ++edema    bilateral LE    ASSESSMENT & PLAN:  60-year-old male history of HLD CAD type 2 diabetes hypertension CVA complaining of bilateral lower extremity edema over the last week worsening and no bowel in for 7 days and complaining of abdominal pain.  No nausea or vomiting.  No abdominal surgeries in the past.        Recently visited ER for similar problem , doppler LE found to be negative and discharged for f/u with Cardio as outpt with po Lasix 20 mg daily  Also reported in Covid positivity 2 weeks ago but remained asymptomatic  at home  prior admission in 04/2023 for symptomatic bradycardia and hypotension possibly related to BB(labetalol) vs arrhythmias  In prior admission Patient declined loop recorder implant despite discussion of risks and benefits.  ECHO (4/9/23): unremarkable w/ normal global  LV systolic function  Pt was planned to have a repeat TTE as outpt by cardiology. Will be ordered   Start Lasix 40 mg IVP daily   Daily weights and I/O  Possible hypervolemic hyponatremia- fluid restriction & diuretics  Reported 7 days of constipation( one BM reported now in ER)- bowel regimen  Chronic microcytic anemia - get iron panel, B12 and folate  CKd stage 3-  CR close to baseline of 1.4  Continue ASA  & procardia but hold hctz , telmisartan and po hypoglycemics. Sliding scale coverage with lantus 20 units qhs  Expected inpatient care exceeds 48 hrs.

## 2023-09-21 NOTE — ED PROVIDER NOTE - OBJECTIVE STATEMENT
Patient 60-year-old male history of HLD CAD type 2 diabetes hypertension CVA complaining of bilateral lower extremity edema over the last week worsening and no bowel in for 7 days and complaining of abdominal pain.  No nausea or vomiting.  No abdominal surgeries in the past.  Patient was here several days ago for bilateral leg swelling was sent him to follow-up with outpatient he followed up with Dr. Edwards put him on diuretics and then told him he needs an echo.  Patient's edema is getting worse and is concerned for his abdominal pain.  No chest pain no fever no chills mild dyspnea on exertion.  No shortness of breath at rest.

## 2023-09-21 NOTE — H&P ADULT - ASSESSMENT
This is a 60-year-old male history of HLD CAD type 2 diabetes hypertension CVA who presents to the ED with bilateral lower extremity edema, acutely worsening over the last week.    #Worsening b/l LLE likely 2/2 acute on chronic HFrEF  - BNP this admission 797, less than prior visit 1577  - CT abdomen and pelvis showing small bilateral pleural effusions  - s/p IV lasix in the ED  - f/u echo  - repeat trop  - f/u lower extremity duplex    #Abdominal pain associated with constipation  - less likely infectious etiology  - senna and miralax prn    #Hyponatremia 2/2 volume overload  - monitor    #CANDICE likely prerenal  - BUN/Cr 20:1  - continue to monitor    #HLD  #HTN  - continue with home meds    #DM  - start on sliding scale  - monitor FS    #Misc  -DVT prophylaxis: lovenox  -GI prophylaxis: Pantoprazole  -Diet: DASH carb consistent  -Code status: Full code  -Activity: IAT  -Dispo: Admit to medicine     This is a 60-year-old male history of HLD CAD type 2 diabetes hypertension CVA who presents to the ED with bilateral lower extremity edema, acutely worsening over the last week.    #Worsening b/l LLE likely 2/2 volume overload suspect HF  - BNP this admission 797, less than prior visit 1577  - CT abdomen and pelvis showing small bilateral pleural effusions  - s/p IV lasix in the ED  - recent duplex completed - no evidence of DVT  - f/u echo  - repeat trop  - f/u lower extremity duplex  - IV lasix 40mg qD  - I/Os, daily weight  - f/u TSH    #Abdominal pain associated with constipation  - less likely infectious etiology  - senna and miralax prn    #Hyponatremia 2/2 volume overload  - monitor    #CKD stage 3 baseline 1.5  - Cr around patient baseline  - continue to monitor     #HLD  #HTN  - continue with home meds    #DM  - start on sliding scale  - monitor FS    #Misc  -DVT prophylaxis: lovenox  -GI prophylaxis: Pantoprazole  -Diet: DASH carb consistent  -Code status: Full code  -Activity: IAT  -Dispo: Admit to medicine     This is a 68-year-old male history of HLD CAD type 2 diabetes hypertension CVA who presents to the ED with bilateral lower extremity edema, acutely worsening over the last week.    #Worsening b/l LLE likely 2/2 volume overload suspect HF  - BNP this admission 797, less than prior visit 1577  - CT abdomen and pelvis showing small bilateral pleural effusions  - s/p IV lasix in the ED  - recent duplex completed - no evidence of DVT  - f/u echo  - IV lasix 40mg qD  - I/Os, daily weight  - f/u TSH    #Abdominal pain associated with constipation  - less likely infectious etiology  - senna and miralax prn    #Hyponatremia 2/2 volume overload  - monitor    #CKD stage 3 baseline 1.5  - Cr around patient baseline  - continue to monitor     #HLD  #HTN  - continue with home meds    #DM  - start on sliding scale  - monitor FS    #Misc  -DVT prophylaxis: lovenox  -GI prophylaxis: Pantoprazole  -Diet: DASH carb consistent  -Code status: Full code  -Activity: IAT  -Dispo: Admit to medicine

## 2023-09-21 NOTE — ED PROVIDER NOTE - PHYSICAL EXAMINATION
VITAL SIGNS: I have reviewed nursing notes and confirm.  CONSTITUTIONAL: Well-developed; well-nourished; in no acute distress.  SKIN: Skin exam is warm and dry, no acute rash.  HEAD: Normocephalic; atraumatic.  EYES: PERRL, EOM intact; conjunctiva and sclera clear.  ENT: No nasal discharge; airway clear.   NECK: Supple; non tender.  CARD:+ S1, S2   RESP: bb rales  ABD: Normal bowel sounds; soft; non-distended; diffusely tender, no guarding or rebound.  EXT: Normal ROM. +b/l LE edema.  LYMPH: No acute adenopathy.  NEURO: Alert. Grossly unremarkable. No focal deficits.  PSYCH: Cooperative, appropriate.

## 2023-09-21 NOTE — ED PROVIDER NOTE - WET READ LAUNCH FT
There are no Wet Read(s) to document. Scribe Attestation (For Scribes USE Only)... Attending Attestation (For Attendings USE Only).../Scribe Attestation (For Scribes USE Only)...

## 2023-09-22 ENCOUNTER — TRANSCRIPTION ENCOUNTER (OUTPATIENT)
Age: 68
End: 2023-09-22

## 2023-09-22 LAB
A1C WITH ESTIMATED AVERAGE GLUCOSE RESULT: 6.5 % — HIGH (ref 4–5.6)
ALBUMIN SERPL ELPH-MCNC: 4 G/DL — SIGNIFICANT CHANGE UP (ref 3.5–5.2)
ALP SERPL-CCNC: 29 U/L — LOW (ref 30–115)
ALT FLD-CCNC: 10 U/L — SIGNIFICANT CHANGE UP (ref 0–41)
ANION GAP SERPL CALC-SCNC: 15 MMOL/L — HIGH (ref 7–14)
AST SERPL-CCNC: 29 U/L — SIGNIFICANT CHANGE UP (ref 0–41)
BASOPHILS # BLD AUTO: 0.09 K/UL — SIGNIFICANT CHANGE UP (ref 0–0.2)
BASOPHILS NFR BLD AUTO: 1.4 % — HIGH (ref 0–1)
BILIRUB SERPL-MCNC: 0.3 MG/DL — SIGNIFICANT CHANGE UP (ref 0.2–1.2)
BUN SERPL-MCNC: 24 MG/DL — HIGH (ref 10–20)
CALCIUM SERPL-MCNC: 9.9 MG/DL — SIGNIFICANT CHANGE UP (ref 8.4–10.5)
CHLORIDE SERPL-SCNC: 97 MMOL/L — LOW (ref 98–110)
CO2 SERPL-SCNC: 22 MMOL/L — SIGNIFICANT CHANGE UP (ref 17–32)
CREAT SERPL-MCNC: 1.3 MG/DL — SIGNIFICANT CHANGE UP (ref 0.7–1.5)
EGFR: 60 ML/MIN/1.73M2 — SIGNIFICANT CHANGE UP
EOSINOPHIL # BLD AUTO: 0.59 K/UL — SIGNIFICANT CHANGE UP (ref 0–0.7)
EOSINOPHIL NFR BLD AUTO: 9.2 % — HIGH (ref 0–8)
ESTIMATED AVERAGE GLUCOSE: 140 MG/DL — HIGH (ref 68–114)
GLUCOSE BLDC GLUCOMTR-MCNC: 107 MG/DL — HIGH (ref 70–99)
GLUCOSE SERPL-MCNC: 92 MG/DL — SIGNIFICANT CHANGE UP (ref 70–99)
HCT VFR BLD CALC: 32.6 % — LOW (ref 42–52)
HGB BLD-MCNC: 11.1 G/DL — LOW (ref 14–18)
IMM GRANULOCYTES NFR BLD AUTO: 0.8 % — HIGH (ref 0.1–0.3)
LYMPHOCYTES # BLD AUTO: 1.43 K/UL — SIGNIFICANT CHANGE UP (ref 1.2–3.4)
LYMPHOCYTES # BLD AUTO: 22.2 % — SIGNIFICANT CHANGE UP (ref 20.5–51.1)
MAGNESIUM SERPL-MCNC: 1.9 MG/DL — SIGNIFICANT CHANGE UP (ref 1.8–2.4)
MCHC RBC-ENTMCNC: 27.4 PG — SIGNIFICANT CHANGE UP (ref 27–31)
MCHC RBC-ENTMCNC: 34 G/DL — SIGNIFICANT CHANGE UP (ref 32–37)
MCV RBC AUTO: 80.5 FL — SIGNIFICANT CHANGE UP (ref 80–94)
MONOCYTES # BLD AUTO: 0.76 K/UL — HIGH (ref 0.1–0.6)
MONOCYTES NFR BLD AUTO: 11.8 % — HIGH (ref 1.7–9.3)
NEUTROPHILS # BLD AUTO: 3.52 K/UL — SIGNIFICANT CHANGE UP (ref 1.4–6.5)
NEUTROPHILS NFR BLD AUTO: 54.6 % — SIGNIFICANT CHANGE UP (ref 42.2–75.2)
NRBC # BLD: 0 /100 WBCS — SIGNIFICANT CHANGE UP (ref 0–0)
PLATELET # BLD AUTO: 515 K/UL — HIGH (ref 130–400)
PMV BLD: 9.8 FL — SIGNIFICANT CHANGE UP (ref 7.4–10.4)
POTASSIUM SERPL-MCNC: 3.7 MMOL/L — SIGNIFICANT CHANGE UP (ref 3.5–5)
POTASSIUM SERPL-SCNC: 3.7 MMOL/L — SIGNIFICANT CHANGE UP (ref 3.5–5)
PROT SERPL-MCNC: 6.6 G/DL — SIGNIFICANT CHANGE UP (ref 6–8)
RBC # BLD: 4.05 M/UL — LOW (ref 4.7–6.1)
RBC # FLD: 13.3 % — SIGNIFICANT CHANGE UP (ref 11.5–14.5)
SODIUM SERPL-SCNC: 134 MMOL/L — LOW (ref 135–146)
TROPONIN T SERPL-MCNC: <0.01 NG/ML — SIGNIFICANT CHANGE UP
TSH SERPL-MCNC: 3.07 UIU/ML — SIGNIFICANT CHANGE UP (ref 0.27–4.2)
WBC # BLD: 6.44 K/UL — SIGNIFICANT CHANGE UP (ref 4.8–10.8)
WBC # FLD AUTO: 6.44 K/UL — SIGNIFICANT CHANGE UP (ref 4.8–10.8)

## 2023-09-22 PROCEDURE — 93306 TTE W/DOPPLER COMPLETE: CPT | Mod: 26

## 2023-09-22 PROCEDURE — 93010 ELECTROCARDIOGRAM REPORT: CPT

## 2023-09-22 PROCEDURE — 99232 SBSQ HOSP IP/OBS MODERATE 35: CPT

## 2023-09-22 RX ORDER — ISOPROPYL ALCOHOL, BENZOCAINE .7; .06 ML/ML; ML/ML
0 SWAB TOPICAL
Qty: 100 | Refills: 1
Start: 2023-09-22

## 2023-09-22 RX ORDER — INFLUENZA VIRUS VACCINE 15; 15; 15; 15 UG/.5ML; UG/.5ML; UG/.5ML; UG/.5ML
0.7 SUSPENSION INTRAMUSCULAR ONCE
Refills: 0 | Status: COMPLETED | OUTPATIENT
Start: 2023-09-22 | End: 2023-09-22

## 2023-09-22 RX ADMIN — PANTOPRAZOLE SODIUM 40 MILLIGRAM(S): 20 TABLET, DELAYED RELEASE ORAL at 05:33

## 2023-09-22 RX ADMIN — POLYETHYLENE GLYCOL 3350 17 GRAM(S): 17 POWDER, FOR SOLUTION ORAL at 15:24

## 2023-09-22 RX ADMIN — Medication 145 MILLIGRAM(S): at 12:40

## 2023-09-22 RX ADMIN — Medication 60 MILLIGRAM(S): at 05:33

## 2023-09-22 RX ADMIN — SENNA PLUS 2 TABLET(S): 8.6 TABLET ORAL at 21:17

## 2023-09-22 RX ADMIN — Medication 40 MILLIGRAM(S): at 05:33

## 2023-09-22 RX ADMIN — ENOXAPARIN SODIUM 40 MILLIGRAM(S): 100 INJECTION SUBCUTANEOUS at 11:46

## 2023-09-22 NOTE — DISCHARGE NOTE PROVIDER - NSDCCPCAREPLAN_GEN_ALL_CORE_FT
PRINCIPAL DISCHARGE DIAGNOSIS  Diagnosis: Fluid overload  Assessment and Plan of Treatment: You came to the hospital and were found to have worsening bilateral lower extremity edema. A CT abdomen and pelvis showed small bilateral pleural effusions. You were treated with IV lasix inpatient and your lower extremity edema improved. Duplex of your legs showed no evidence of DVT (blood clot). Echo of your heart showed a normal ejection fraction with no significant valvular regurgitation or stenosis.   You were found to have microcytic anemia. Please follow up with your primary care physician to get a repeat iron panel, B12 and folate level. You were also found to have a low sodium level and were treated with fluid restriction and diuretics. Please also follow up with your PCP to get a repeat sodium level.   Lastly, please follow up with your PCP for management of your CKD stage 3 (creatinine at baseline), as well as for your hyerlipidemia, hypertension, and diabetes. Please continue taking all of your medications as prescribed.      SECONDARY DISCHARGE DIAGNOSES  Diagnosis: Hyponatremia  Assessment and Plan of Treatment:     Diagnosis: Constipation  Assessment and Plan of Treatment:      PRINCIPAL DISCHARGE DIAGNOSIS  Diagnosis: Fluid overload  Assessment and Plan of Treatment: You came to the hospital and were found to have worsening bilateral lower extremity edema. A CT abdomen and pelvis showed small bilateral pleural effusions. You were treated with IV lasix inpatient and your lower extremity edema improved. Duplex of your legs showed no evidence of DVT (blood clot). Echo of your heart showed a normal ejection fraction with no significant valvular regurgitation or stenosis. Please follow up with Cardiology (Dr. Edwards) outpatient for cardiology management.   You were found to have microcytic anemia. Please follow up with your primary care physician to get a repeat iron panel, B12 and folate level. You were also found to have a low sodium level and were treated with fluid restriction and diuretics. Please also follow up with your PCP to get a repeat sodium level.   Lastly, please follow up with your PCP for management of your CKD stage 3 (creatinine at baseline), as well as for your hyerlipidemia, hypertension, and diabetes. Please continue taking all of your medications as prescribed.      SECONDARY DISCHARGE DIAGNOSES  Diagnosis: Hyponatremia  Assessment and Plan of Treatment:     Diagnosis: Constipation  Assessment and Plan of Treatment:      PRINCIPAL DISCHARGE DIAGNOSIS  Diagnosis: Fluid overload  Assessment and Plan of Treatment: You came to the hospital and were found to have worsening bilateral lower extremity edema. A CT abdomen and pelvis showed small bilateral pleural effusions. You were treated with IV lasix inpatient and your lower extremity edema improved. Duplex of your legs showed no evidence of DVT (blood clot). Echo of your heart showed a normal ejection fraction with no significant valvular regurgitation or stenosis. Cardiology recommended that you take lasix 40mg daily, so please pick that up from your pharmacy and take as prescribed. Please follow up with Cardiology (Dr. Edwards) outpatient for cardiology management.   You were found to have microcytic anemia. Please follow up with your primary care physician to get a repeat iron panel, B12 and folate level. You were also found to have a low sodium level and were treated with fluid restriction and diuretics. Please also follow up with your PCP to get a repeat sodium level.   Lastly, please follow up with your PCP for management of your CKD stage 3 (creatinine at baseline), as well as for your hyerlipidemia, hypertension, and diabetes. Please continue taking all of your medications as prescribed.      SECONDARY DISCHARGE DIAGNOSES  Diagnosis: Hyponatremia  Assessment and Plan of Treatment:     Diagnosis: Constipation  Assessment and Plan of Treatment:

## 2023-09-22 NOTE — DISCHARGE NOTE PROVIDER - NSDCFUADDAPPT_GEN_ALL_CORE_FT
APPTS ARE READY TO BE MADE: [ x] YES    Best Family or Patient Contact (if needed):     Additional Information about above appointments (if needed):    1: Dr. Edwards  2:   3:     Other comments or requests:    APPTS ARE READY TO BE MADE: [ x] YES    Best Family or Patient Contact (if needed):     Additional Information about above appointments (if needed):    1: Dr. Edwards  2:   3:     Other comments or requests:   Declined, refuse to verify .

## 2023-09-22 NOTE — DISCHARGE NOTE PROVIDER - CARE PROVIDER_API CALL
Rashad Carson  Internal Medicine  [address] [place] [id number]  Phone: [phone]  Fax: [phone]  Follow Up Time: 1 week

## 2023-09-22 NOTE — DISCHARGE NOTE PROVIDER - NSDCMRMEDTOKEN_GEN_ALL_CORE_FT
Aspir 81 oral delayed release tablet: 1 tab(s) orally once a day  atorvastatin 20 mg oral tablet: 1 tab(s) orally once a day  fenofibrate 145 mg oral tablet: 1 tab(s) orally once a day  furosemide 20 mg oral tablet: 1 tab(s) orally once a day  metFORMIN 1000 mg oral tablet: 1 tab(s) orally 2 times a day  NIFEdipine 60 mg oral tablet, extended release: 1 tab(s) orally once a day  telmisartan-hydrochlorothiazide 80 mg-25 mg oral tablet: 1 tab(s) orally once a day  Vitamin B12 50 mcg oral tablet: 1 tab(s) orally once a day   alcohol swabs: Apply topically to affected area 4 times a day  Aspir 81 oral delayed release tablet: 1 tab(s) orally once a day  atorvastatin 20 mg oral tablet: 1 tab(s) orally once a day  fenofibrate 145 mg oral tablet: 1 tab(s) orally once a day  furosemide 20 mg oral tablet: 1 tab(s) orally once a day  glucometer (per patient&#x27;s insurance): Test blood sugars four times a day. Dispense #1 glucometer.  lancets: 1 application subcutaneously 4 times a day  metFORMIN 1000 mg oral tablet: 1 tab(s) orally 2 times a day  NIFEdipine 60 mg oral tablet, extended release: 1 tab(s) orally once a day  telmisartan-hydrochlorothiazide 80 mg-25 mg oral tablet: 1 tab(s) orally once a day  test strips (per patient&#x27;s insurance): 1 application subcutaneously 4 times a day. ** Compatible with patient&#x27;s glucometer **  Vitamin B12 50 mcg oral tablet: 1 tab(s) orally once a day   alcohol swabs: Apply topically to affected area 4 times a day  Aspir 81 oral delayed release tablet: 1 tab(s) orally once a day  atorvastatin 20 mg oral tablet: 1 tab(s) orally once a day  fenofibrate 145 mg oral tablet: 1 tab(s) orally once a day  glucometer (per patient&#x27;s insurance): Test blood sugars four times a day. Dispense #1 glucometer.  lancets: 1 application subcutaneously 4 times a day  Lasix 40 mg oral tablet: 1 tab(s) orally once a day  metFORMIN 1000 mg oral tablet: 1 tab(s) orally 2 times a day  NIFEdipine 60 mg oral tablet, extended release: 1 tab(s) orally once a day  telmisartan-hydrochlorothiazide 80 mg-25 mg oral tablet: 1 tab(s) orally once a day  test strips (per patient&#x27;s insurance): 1 application subcutaneously 4 times a day. ** Compatible with patient&#x27;s glucometer **  Vitamin B12 50 mcg oral tablet: 1 tab(s) orally once a day

## 2023-09-22 NOTE — PROGRESS NOTE ADULT - ATTENDING COMMENTS
This is a 68-year-old male history of HLD CAD type 2 diabetes hypertension CVA who presents to the ED with bilateral lower extremity edema, acutely worsening over the last week.    Interval Events:    No acute overnight events. patient is tolerating po intake, patient is having BM, and urination. Patient is ambutlating. No active  complaints.     GENERAL: NAD, lying in bed comfortably  CHEST/LUNG: Clear to auscultation bilaterally; No rales, rhonchi, wheezing, or rubs. Unlabored respirations  HEART: Regular rate and rhythm; No murmurs, rubs, or gallops  ABDOMEN: Bowel sounds present; Soft, Nontender, Nondistended. No hepatomegally  EXTREMITIES:  +1 edema b/l LE  NERVOUS SYSTEM:  Alert & Oriented X3, speech clear. No deficits   MSK: FROM all 4 extremities, full and equal strength  SKIN: No rashes or lesions    #Worsening b/l LLE likely 2/2 volume overload suspect HF  - BNP this admission 797, less than prior visit 1577  - CT abdomen and pelvis showing small bilateral pleural effusions  - S/p IV lasix in the ED  - Recent duplex completed - no evidence of DVT  - echo-> EF >50% with normal ventricular function  - EKG: t wave inversion on the lateral leads  - IV lasix 40mg qD  - I/Os, daily weight  - F/u TSH  - F/u UA  - cardio consult     #Chronic microcytic anemia  - F/u iron panel, B12 and folate    #Abdominal pain associated with constipation - resolved  - Less likely infectious etiology  - Senna and miralax prn    #Hyponatremia 2/2 volume overload  - Monitor BMP  - Fluid restriction     #CKD stage 3 baseline 1.5  - Cr around patient baseline  - Continue to monitor     #HLD  #HTN  - Continue with home meds    #DM  - ISS    #Misc  - DVT prophylaxis: Lovenox   - GI prophylaxis: Pantoprazole    #Progress Note Handoff  Pending (specify):  pending cardio consult   Family discussion: spoke to patient   Disposition: Home This is a 68-year-old male history of HLD CAD type 2 diabetes hypertension CVA who presents to the ED with bilateral lower extremity edema, acutely worsening over the last week.    Interval Events:    No acute overnight events. patient is tolerating po intake, patient is having BM, and urination. Patient is ambutlating. No active  complaints.     GENERAL: NAD, lying in bed comfortably  CHEST/LUNG: Clear to auscultation bilaterally; No rales, rhonchi, wheezing, or rubs. Unlabored respirations  HEART: Regular rate and rhythm; No murmurs, rubs, or gallops  ABDOMEN: Bowel sounds present; Soft, Nontender, Nondistended. No hepatomegally  EXTREMITIES:  +1 edema b/l LE  NERVOUS SYSTEM:  Alert & Oriented X3, speech clear. No deficits   MSK: FROM all 4 extremities, full and equal strength  SKIN: No rashes or lesions    #Acute on chronic HFpEF exacerbation  - BNP this admission 797, less than prior visit 1577  - CT abdomen and pelvis showing small bilateral pleural effusions  - S/p IV lasix in the ED  - Recent duplex completed - no evidence of DVT  - echo-> EF >50% with normal ventricular function  - EKG: t wave inversion on the lateral leads  - IV lasix 40mg qD  - I/Os, daily weight  - F/u TSH  - F/u UA  - cardio consult     #Chronic microcytic anemia  - F/u iron panel, B12 and folate    #Abdominal pain associated with constipation - resolved  - Less likely infectious etiology  - Senna and miralax prn    #Hyponatremia 2/2 volume overload  - Monitor BMP  - Fluid restriction     #CKD stage 3 baseline 1.5  - Cr around patient baseline  - Continue to monitor     #HLD  #HTN  - Continue with home meds    #DM  - ISS    #Misc  - DVT prophylaxis: Lovenox   - GI prophylaxis: Pantoprazole    #Progress Note Handoff  Pending (specify):  pending cardio consult   Family discussion: spoke to patient   Disposition: Home

## 2023-09-22 NOTE — PATIENT PROFILE ADULT - FALL HARM RISK - RISK INTERVENTIONS
Assistance OOB with selected safe patient handling equipment/Assistance with ambulation/Communicate Fall Risk and Risk Factors to all staff, patient, and family/Discuss with provider need for PT consult/Monitor for mental status changes/Monitor gait and stability/Move patient closer to nurses' station/Provide patient with walking aids - walker, cane, crutches/Reinforce activity limits and safety measures with patient and family/Reorient to person, place and time as needed/Review medications for side effects contributing to fall risk/Sit up slowly, dangle for a short time, stand at bedside before walking/Toileting schedule using arm’s reach rule for commode and bathroom/Use of alarms - bed, chair and/or voice tab/Visual Cue: Yellow wristband

## 2023-09-22 NOTE — PROGRESS NOTE ADULT - SUBJECTIVE AND OBJECTIVE BOX
24H events:    Patient is a 68y old Male who presents with a chief complaint of   Primary diagnosis of Fluid overload      Day 1:  Day 2:  Day 3:     Today is hospital day 1d. This morning patient was seen and examined at bedside, resting comfortably in bed.    No acute or major events overnight.    Code Status:    Family communication:  Contact date:  Name of person contacted:  Relationship to patient:  Communication details:  What matters most:    PAST MEDICAL & SURGICAL HISTORY  DM (diabetes mellitus)    HTN (hypertension)    HLD (hyperlipidemia)    CVA (cerebrovascular accident)    Status post cataract extraction and insertion of intraocular lens of left eye      SOCIAL HISTORY:  Social History:      ALLERGIES:  No Known Allergies    MEDICATIONS:  STANDING MEDICATIONS  aspirin enteric coated 81 milliGRAM(s) Oral daily  atorvastatin 20 milliGRAM(s) Oral at bedtime  dextrose 5%. 1000 milliLiter(s) IV Continuous <Continuous>  dextrose 5%. 1000 milliLiter(s) IV Continuous <Continuous>  dextrose 50% Injectable 25 Gram(s) IV Push once  dextrose 50% Injectable 25 Gram(s) IV Push once  dextrose 50% Injectable 12.5 Gram(s) IV Push once  enoxaparin Injectable 40 milliGRAM(s) SubCutaneous every 24 hours  fenofibrate Tablet 145 milliGRAM(s) Oral daily  furosemide   Injectable 40 milliGRAM(s) IV Push daily  glucagon  Injectable 1 milliGRAM(s) IntraMuscular once  influenza  Vaccine (HIGH DOSE) 0.7 milliLiter(s) IntraMuscular once  insulin lispro (ADMELOG) corrective regimen sliding scale   SubCutaneous three times a day before meals  NIFEdipine XL 60 milliGRAM(s) Oral daily  pantoprazole    Tablet 40 milliGRAM(s) Oral before breakfast  polyethylene glycol 3350 17 Gram(s) Oral daily  senna 2 Tablet(s) Oral at bedtime    PRN MEDICATIONS  dextrose Oral Gel 15 Gram(s) Oral once PRN    VITALS:   T(F): 98.1  HR: 100  BP: 144/63  RR: 18  SpO2: 97%    PHYSICAL EXAM:  GENERAL:   (  ) NAD, lying in bed comfortably     (  ) obtunded     (  ) lethargic     (  ) somnolent    HEAD:   (  ) Atraumatic     (  ) hematoma     (  ) laceration (specify location:       )     NECK:  (  ) Supple     (  ) neck stiffness     (  ) nuchal rigidity     (  )  no JVD     (  ) JVD present ( -- cm)    HEART:  Rate -->     (  ) normal rate     (  ) bradycardic     (  ) tachycardic  Rhythm -->     (  ) regular     (  ) regularly irregular     (  ) irregularly irregular  Murmurs -->     (  ) normal s1s2     (  ) systolic murmur     (  ) diastolic murmur     (  ) continuous murmur      (  ) S3 present     (  ) S4 present    LUNGS:   (  )Unlabored respirations     (  ) tachypnea  (  ) B/L air entry     (  ) decreased breath sounds in:  (location     )    (  ) no adventitious sound     (  ) crackles     (  ) wheezing      (  ) rhonchi      (specify location:       )  (  ) chest wall tenderness (specify location:       )    ABDOMEN:   (  ) Soft     (  ) tense   |   (  ) nondistended     (  ) distended   |   (  ) +BS     (  ) hypoactive bowel sounds     (  ) hyperactive bowel sounds  (  ) nontender     (  ) RUQ tenderness     (  ) RLQ tenderness     (  ) LLQ tenderness     (  ) epigastric tenderness     (  ) diffuse tenderness  (  ) Splenomegaly      (  ) Hepatomegaly      (  ) Jaundice     (  ) ecchymosis     EXTREMITIES:  (  ) Normal     (  ) Rash     (  ) ecchymosis     (  ) varicose veins      (  ) pitting edema     (  ) non-pitting edema   (  ) ulceration     (  ) gangrene:     (location:     )    NERVOUS SYSTEM:    (  ) A&Ox3     (  ) confused     (  ) lethargic  CN II-XII:     (  ) Intact     (  ) deficits found     (Specify:     )   Upper extremities:     (  ) no sensorimotor deficits     (  ) weakness     (  ) loss of proprioception/vibration     (  ) loss of touch/temperature (specify:    )  Lower extremities:     (  ) no sensorimotor deficits     (  ) weakness     (  ) loss of proprioception/vibration     (  ) loss of touch/temperature (specify:    )    SKIN:   (  ) No rashes or lesions     (  ) maculopapular rash     (  ) pustules     (  ) vesicles     (  ) ulcer     (  ) ecchymosis     (specify location:     )    AMPAC score:    (  ) Indwelling Kam Catheter:   Date insterted:    Reason (  ) Critical illness     (  ) urinary retention    (  ) Accurate Ins/Outs Monitoring     (  ) CMO patient    (  ) Central Line:   Date inserted:  Location: (  ) Right IJ     (  ) Left IJ     (  ) Right Fem     (  ) Left Fem    (  ) SPC        (  ) pigtail       (  ) PEG tube       (  ) colostomy       (  ) jejunostomy  (  ) U-Dall    LABS:                        11.1   6.44  )-----------( 515      ( 22 Sep 2023 05:44 )             32.6     09-22    134<L>  |  97<L>  |  24<H>  ----------------------------<  92  3.7   |  22  |  1.3    Ca    9.9      22 Sep 2023 05:44  Mg     1.9     09-22    TPro  6.6  /  Alb  4.0  /  TBili  0.3  /  DBili  x   /  AST  29  /  ALT  10  /  AlkPhos  29<L>  09-22      Urinalysis Basic - ( 22 Sep 2023 05:44 )    Color: x / Appearance: x / SG: x / pH: x  Gluc: 92 mg/dL / Ketone: x  / Bili: x / Urobili: x   Blood: x / Protein: x / Nitrite: x   Leuk Esterase: x / RBC: x / WBC x   Sq Epi: x / Non Sq Epi: x / Bacteria: x            CARDIAC MARKERS ( 21 Sep 2023 06:35 )  x     / <0.01 ng/mL / x     / x     / x          RADIOLOGY:           24H events:    Patient is a 68y old Male who presents with a chief complaint of abdominal pain  Primary diagnosis of Fluid overload    Today is hospital day 1d. This morning patient was seen and examined at bedside, resting comfortably in bed.    No acute or major events overnight. Patient says his lower extremity swelling has improved since admission. Patient denies any pain, trouble breathing, nausea, vomiting, diarrhea.     Code Status: Full Code    Family communication:  Contact date:  Name of person contacted:  Relationship to patient:  Communication details:  What matters most:    PAST MEDICAL & SURGICAL HISTORY  DM (diabetes mellitus)    HTN (hypertension)    HLD (hyperlipidemia)    CVA (cerebrovascular accident)    Status post cataract extraction and insertion of intraocular lens of left eye      SOCIAL HISTORY:  Social History:      ALLERGIES:  No Known Allergies    MEDICATIONS:  STANDING MEDICATIONS  aspirin enteric coated 81 milliGRAM(s) Oral daily  atorvastatin 20 milliGRAM(s) Oral at bedtime  dextrose 5%. 1000 milliLiter(s) IV Continuous <Continuous>  dextrose 5%. 1000 milliLiter(s) IV Continuous <Continuous>  dextrose 50% Injectable 25 Gram(s) IV Push once  dextrose 50% Injectable 25 Gram(s) IV Push once  dextrose 50% Injectable 12.5 Gram(s) IV Push once  enoxaparin Injectable 40 milliGRAM(s) SubCutaneous every 24 hours  fenofibrate Tablet 145 milliGRAM(s) Oral daily  furosemide   Injectable 40 milliGRAM(s) IV Push daily  glucagon  Injectable 1 milliGRAM(s) IntraMuscular once  influenza  Vaccine (HIGH DOSE) 0.7 milliLiter(s) IntraMuscular once  insulin lispro (ADMELOG) corrective regimen sliding scale   SubCutaneous three times a day before meals  NIFEdipine XL 60 milliGRAM(s) Oral daily  pantoprazole    Tablet 40 milliGRAM(s) Oral before breakfast  polyethylene glycol 3350 17 Gram(s) Oral daily  senna 2 Tablet(s) Oral at bedtime    PRN MEDICATIONS  dextrose Oral Gel 15 Gram(s) Oral once PRN    VITALS:   T(F): 98.1  HR: 100  BP: 144/63  RR: 18  SpO2: 97%    PHYSICAL EXAM:  GENERAL: Obese  ( x ) NAD, lying in bed comfortably     (  ) obtunded     (  ) lethargic     (  ) somnolent    HEAD:   ( x ) Atraumatic     (  ) hematoma     (  ) laceration (specify location:       )     NECK:  ( x ) Supple     (  ) neck stiffness     (  ) nuchal rigidity     (  )  no JVD     (  ) JVD present ( -- cm)    HEART:  Rate -->     ( x ) normal rate     (  ) bradycardic     (  ) tachycardic  Rhythm -->     ( x ) regular     (  ) regularly irregular     (  ) irregularly irregular  Murmurs -->     ( x ) normal s1s2     (  ) systolic murmur     (  ) diastolic murmur     (  ) continuous murmur      (  ) S3 present     (  ) S4 present    LUNGS:   ( x )Unlabored respirations     (  ) tachypnea  ( x ) B/L air entry     (  ) decreased breath sounds in:  (location     )    ( x ) no adventitious sound     (  ) crackles     (  ) wheezing      (  ) rhonchi      (specify location:       )  (  ) chest wall tenderness (specify location:       )    ABDOMEN:   ( x ) Soft     (  ) tense   |   ( x ) nondistended     (  ) distended   |   (  ) +BS     (  ) hypoactive bowel sounds     (  ) hyperactive bowel sounds  ( x ) nontender     (  ) RUQ tenderness     (  ) RLQ tenderness     (  ) LLQ tenderness     (  ) epigastric tenderness     (  ) diffuse tenderness  (  ) Splenomegaly      (  ) Hepatomegaly      (  ) Jaundice     (  ) ecchymosis     EXTREMITIES: Lower extremity edema bilaterally, improving   (  ) Normal     (  ) Rash     (  ) ecchymosis     (  ) varicose veins      (  ) pitting edema     (  ) non-pitting edema   (  ) ulceration     (  ) gangrene:     (location:     )    NERVOUS SYSTEM:    ( x ) A&Ox3     (  ) confused     (  ) lethargic  CN II-XII:     ( x ) Intact     (  ) deficits found     (Specify:     )   Upper extremities:     ( x ) no sensorimotor deficits     (  ) weakness     (  ) loss of proprioception/vibration     (  ) loss of touch/temperature (specify:    )  Lower extremities:     ( x ) no sensorimotor deficits     (  ) weakness     (  ) loss of proprioception/vibration     (  ) loss of touch/temperature (specify:    )    SKIN:   ( x ) No rashes or lesions     (  ) maculopapular rash     (  ) pustules     (  ) vesicles     (  ) ulcer     (  ) ecchymosis     (specify location:     )    AMPAC score:    (  ) Indwelling Kam Catheter:   Date insterted:    Reason (  ) Critical illness     (  ) urinary retention    (  ) Accurate Ins/Outs Monitoring     (  ) CMO patient    (  ) Central Line:   Date inserted:  Location: (  ) Right IJ     (  ) Left IJ     (  ) Right Fem     (  ) Left Fem    (  ) SPC        (  ) pigtail       (  ) PEG tube       (  ) colostomy       (  ) jejunostomy  (  ) U-Dall    LABS:                        11.1   6.44  )-----------( 515      ( 22 Sep 2023 05:44 )             32.6     09-22    134<L>  |  97<L>  |  24<H>  ----------------------------<  92  3.7   |  22  |  1.3    Ca    9.9      22 Sep 2023 05:44  Mg     1.9     09-22    TPro  6.6  /  Alb  4.0  /  TBili  0.3  /  DBili  x   /  AST  29  /  ALT  10  /  AlkPhos  29<L>  09-22      Urinalysis Basic - ( 22 Sep 2023 05:44 )    Color: x / Appearance: x / SG: x / pH: x  Gluc: 92 mg/dL / Ketone: x  / Bili: x / Urobili: x   Blood: x / Protein: x / Nitrite: x   Leuk Esterase: x / RBC: x / WBC x   Sq Epi: x / Non Sq Epi: x / Bacteria: x            CARDIAC MARKERS ( 21 Sep 2023 06:35 )  x     / <0.01 ng/mL / x     / x     / x          RADIOLOGY:

## 2023-09-22 NOTE — PROGRESS NOTE ADULT - ASSESSMENT
This is a 68-year-old male history of HLD CAD type 2 diabetes hypertension CVA who presents to the ED with bilateral lower extremity edema, acutely worsening over the last week.    #Worsening b/l LLE likely 2/2 volume overload suspect HF  - BNP this admission 797, less than prior visit 1577  - CT abdomen and pelvis showing small bilateral pleural effusions  - s/p IV lasix in the ED  - recent duplex completed - no evidence of DVT  - f/u echo  - IV lasix 40mg qD  - I/Os, daily weight  - f/u TSH    #Abdominal pain associated with constipation  - less likely infectious etiology  - senna and miralax prn    #Hyponatremia 2/2 volume overload  - monitor    #CKD stage 3 baseline 1.5  - Cr around patient baseline  - continue to monitor     #HLD  #HTN  - continue with home meds    #DM  - start on sliding scale  - monitor FS    #Misc  -DVT prophylaxis: lovenox  -GI prophylaxis: Pantoprazole  -Diet: DASH carb consistent  -Code status: Full code  -Activity: IAT  -Dispo: Admit to medicine   This is a 68-year-old male history of HLD CAD type 2 diabetes hypertension CVA who presents to the ED with bilateral lower extremity edema, acutely worsening over the last week.    #Worsening b/l LLE likely 2/2 volume overload suspect HF  - BNP this admission 797, less than prior visit 1577  - CT abdomen and pelvis showing small bilateral pleural effusions  - S/p IV lasix in the ED  - Recent duplex completed - no evidence of DVT  - F/u echo  - IV lasix 40mg qD  - I/Os, daily weight  - F/u TSH    #Abdominal pain associated with constipation  - Less likely infectious etiology  - Senna and miralax prn    #Hyponatremia 2/2 volume overload  - Monitor BMP    #CKD stage 3 baseline 1.5  - Cr around patient baseline  - Continue to monitor     #HLD  #HTN  - Continue with home meds    #DM  - ISS    #Misc  -DVT prophylaxis: lovenox  -GI prophylaxis: Pantoprazole     This is a 68-year-old male history of HLD CAD type 2 diabetes hypertension CVA who presents to the ED with bilateral lower extremity edema, acutely worsening over the last week.    #Worsening b/l LLE likely 2/2 volume overload suspect HF  - BNP this admission 797, less than prior visit 1577  - CT abdomen and pelvis showing small bilateral pleural effusions  - S/p IV lasix in the ED  - Recent duplex completed - no evidence of DVT  - F/u echo  - IV lasix 40mg qD  - I/Os, daily weight  - F/u TSH  - F/u UA    #Chronic microcytic anemia  - F/u iron panel, B12 and folate    #Abdominal pain associated with constipation - resolved  - Less likely infectious etiology  - Senna and miralax prn    #Hyponatremia 2/2 volume overload  - Monitor BMP  - Fluid restriction     #CKD stage 3 baseline 1.5  - Cr around patient baseline  - Continue to monitor     #HLD  #HTN  - Continue with home meds    #DM  - ISS    #Misc  - DVT prophylaxis: Lovenox   - GI prophylaxis: Pantoprazole

## 2023-09-22 NOTE — DISCHARGE NOTE PROVIDER - NSDCFUSCHEDAPPT_GEN_ALL_CORE_FT
Bib Edwards Physician [facility]  CARDIOLOGY [address]  Scheduled Appointment: 11/20/2023    Bib Edwards Physician [facility]  CARDIOLOGY [address]  Scheduled Appointment: 12/05/2023

## 2023-09-23 ENCOUNTER — TRANSCRIPTION ENCOUNTER (OUTPATIENT)
Age: 68
End: 2023-09-23

## 2023-09-23 VITALS
SYSTOLIC BLOOD PRESSURE: 147 MMHG | DIASTOLIC BLOOD PRESSURE: 67 MMHG | TEMPERATURE: 97 F | RESPIRATION RATE: 18 BRPM | HEART RATE: 71 BPM

## 2023-09-23 LAB
APPEARANCE UR: CLEAR — SIGNIFICANT CHANGE UP
BILIRUB UR-MCNC: NEGATIVE — SIGNIFICANT CHANGE UP
COLOR SPEC: YELLOW — SIGNIFICANT CHANGE UP
DIFF PNL FLD: NEGATIVE — SIGNIFICANT CHANGE UP
GLUCOSE UR QL: NEGATIVE MG/DL — SIGNIFICANT CHANGE UP
KETONES UR-MCNC: NEGATIVE MG/DL — SIGNIFICANT CHANGE UP
LEUKOCYTE ESTERASE UR-ACNC: NEGATIVE — SIGNIFICANT CHANGE UP
NITRITE UR-MCNC: NEGATIVE — SIGNIFICANT CHANGE UP
PH UR: 6 — SIGNIFICANT CHANGE UP (ref 5–8)
PROT UR-MCNC: NEGATIVE MG/DL — SIGNIFICANT CHANGE UP
SP GR SPEC: 1 — SIGNIFICANT CHANGE UP (ref 1–1.03)
UROBILINOGEN FLD QL: 0.2 MG/DL — SIGNIFICANT CHANGE UP (ref 0.2–1)

## 2023-09-23 PROCEDURE — 99239 HOSP IP/OBS DSCHRG MGMT >30: CPT

## 2023-09-23 PROCEDURE — 99222 1ST HOSP IP/OBS MODERATE 55: CPT | Mod: GC

## 2023-09-23 RX ORDER — FUROSEMIDE 40 MG
1 TABLET ORAL
Refills: 0 | DISCHARGE

## 2023-09-23 RX ORDER — FUROSEMIDE 40 MG
1 TABLET ORAL
Qty: 30 | Refills: 2
Start: 2023-09-23

## 2023-09-23 RX ORDER — ISOPROPYL ALCOHOL, BENZOCAINE .7; .06 ML/ML; ML/ML
0 SWAB TOPICAL
Qty: 100 | Refills: 1
Start: 2023-09-23

## 2023-09-23 RX ADMIN — Medication 60 MILLIGRAM(S): at 05:38

## 2023-09-23 RX ADMIN — Medication 81 MILLIGRAM(S): at 11:43

## 2023-09-23 RX ADMIN — Medication 145 MILLIGRAM(S): at 11:43

## 2023-09-23 RX ADMIN — PANTOPRAZOLE SODIUM 40 MILLIGRAM(S): 20 TABLET, DELAYED RELEASE ORAL at 05:37

## 2023-09-23 RX ADMIN — POLYETHYLENE GLYCOL 3350 17 GRAM(S): 17 POWDER, FOR SOLUTION ORAL at 11:43

## 2023-09-23 RX ADMIN — ENOXAPARIN SODIUM 40 MILLIGRAM(S): 100 INJECTION SUBCUTANEOUS at 11:43

## 2023-09-23 RX ADMIN — Medication 40 MILLIGRAM(S): at 05:40

## 2023-09-23 NOTE — PROGRESS NOTE ADULT - ASSESSMENT
This is a 68-year-old male history of HLD CAD type 2 diabetes hypertension CVA who presents to the ED with bilateral lower extremity edema, acutely worsening over the last week.    #Worsening b/l LLE likely 2/2 volume overload suspect HF  - BNP this admission 797, less than prior visit 1577  - CT abdomen and pelvis showing small bilateral pleural effusions  - S/p IV lasix in the ED  - Recent duplex completed - no evidence of DVT  - Echo showing normal EF  - IV lasix 40mg qD - to continue upon discharge   - UA negative     #Chronic microcytic anemia - outpatient follow up     #Abdominal pain associated with constipation - resolved  - Less likely infectious etiology  - Senna and miralax prn    #Hyponatremia 2/2 volume overload  - Monitor BMP  - Fluid restriction     #CKD stage 3 baseline 1.5  - Cr around patient baseline  - Continue to monitor     #HLD  #HTN  - Continue with home meds    #DM  - ISS    #Misc  - DVT prophylaxis: Lovenox   - GI prophylaxis: Pantoprazole

## 2023-09-23 NOTE — DISCHARGE NOTE NURSING/CASE MANAGEMENT/SOCIAL WORK - PATIENT PORTAL LINK FT
You can access the FollowMyHealth Patient Portal offered by [facility] by registering at the following website: [email or web address]. By joining [employer]’s FollowMyHealth portal, you will also be able to view your health information using other applications (apps) compatible with our system.

## 2023-09-23 NOTE — PROGRESS NOTE ADULT - SUBJECTIVE AND OBJECTIVE BOX
24H events:    Patient is a 68y old Male who presents with a chief complaint of Lower extremity edema (22 Sep 2023 10:36)    Primary diagnosis of Fluid overload    Today is hospital day 2d. This morning patient was seen and examined at bedside, resting comfortably in bed.    No acute or major events overnight. Patient's lower extremity swelling continues to improve. Patient denies any symptoms this morning.     Code Status: Full Code    Family communication:  Contact date:  Name of person contacted:  Relationship to patient:  Communication details:  What matters most:    PAST MEDICAL & SURGICAL HISTORY  DM (diabetes mellitus)    HTN (hypertension)    HLD (hyperlipidemia)    CVA (cerebrovascular accident)    Status post cataract extraction and insertion of intraocular lens of left eye      SOCIAL HISTORY:  Social History:      ALLERGIES:  No Known Allergies    MEDICATIONS:  STANDING MEDICATIONS  aspirin enteric coated 81 milliGRAM(s) Oral daily  atorvastatin 20 milliGRAM(s) Oral at bedtime  dextrose 5%. 1000 milliLiter(s) IV Continuous <Continuous>  dextrose 5%. 1000 milliLiter(s) IV Continuous <Continuous>  dextrose 50% Injectable 25 Gram(s) IV Push once  dextrose 50% Injectable 25 Gram(s) IV Push once  dextrose 50% Injectable 12.5 Gram(s) IV Push once  enoxaparin Injectable 40 milliGRAM(s) SubCutaneous every 24 hours  fenofibrate Tablet 145 milliGRAM(s) Oral daily  furosemide   Injectable 40 milliGRAM(s) IV Push daily  glucagon  Injectable 1 milliGRAM(s) IntraMuscular once  insulin lispro (ADMELOG) corrective regimen sliding scale   SubCutaneous three times a day before meals  NIFEdipine XL 60 milliGRAM(s) Oral daily  pantoprazole    Tablet 40 milliGRAM(s) Oral before breakfast  polyethylene glycol 3350 17 Gram(s) Oral daily  senna 2 Tablet(s) Oral at bedtime    PRN MEDICATIONS  dextrose Oral Gel 15 Gram(s) Oral once PRN    VITALS:   T(F): 97  HR: 71  BP: 147/67  RR: 18  SpO2: --    PHYSICAL EXAM:  GENERAL: Obese  ( x ) NAD, lying in bed comfortably     (  ) obtunded     (  ) lethargic     (  ) somnolent    HEAD:   ( x ) Atraumatic     (  ) hematoma     (  ) laceration (specify location:       )     NECK:  ( x ) Supple     (  ) neck stiffness     (  ) nuchal rigidity     (  )  no JVD     (  ) JVD present ( -- cm)    HEART:  Rate -->     ( x ) normal rate     (  ) bradycardic     (  ) tachycardic  Rhythm -->     ( x ) regular     (  ) regularly irregular     (  ) irregularly irregular  Murmurs -->     ( x ) normal s1s2     (  ) systolic murmur     (  ) diastolic murmur     (  ) continuous murmur      (  ) S3 present     (  ) S4 present    LUNGS:   ( x )Unlabored respirations     (  ) tachypnea  ( x ) B/L air entry     (  ) decreased breath sounds in:  (location     )    ( x ) no adventitious sound     (  ) crackles     (  ) wheezing      (  ) rhonchi      (specify location:       )  (  ) chest wall tenderness (specify location:       )    ABDOMEN:   ( x ) Soft     (  ) tense   |   ( x ) nondistended     (  ) distended   |   (  ) +BS     (  ) hypoactive bowel sounds     (  ) hyperactive bowel sounds  ( x ) nontender     (  ) RUQ tenderness     (  ) RLQ tenderness     (  ) LLQ tenderness     (  ) epigastric tenderness     (  ) diffuse tenderness  (  ) Splenomegaly      (  ) Hepatomegaly      (  ) Jaundice     (  ) ecchymosis     EXTREMITIES: Lower extremity edema bilaterally, improving   (  ) Normal     (  ) Rash     (  ) ecchymosis     (  ) varicose veins      (  ) pitting edema     (  ) non-pitting edema   (  ) ulceration     (  ) gangrene:     (location:     )    NERVOUS SYSTEM:    ( x ) A&Ox3     (  ) confused     (  ) lethargic  CN II-XII:     ( x ) Intact     (  ) deficits found     (Specify:     )   Upper extremities:     ( x ) no sensorimotor deficits     (  ) weakness     (  ) loss of proprioception/vibration     (  ) loss of touch/temperature (specify:    )  Lower extremities:     ( x ) no sensorimotor deficits     (  ) weakness     (  ) loss of proprioception/vibration     (  ) loss of touch/temperature (specify:    )    SKIN:   ( x ) No rashes or lesions     (  ) maculopapular rash     (  ) pustules     (  ) vesicles     (  ) ulcer     (  ) ecchymosis     (specify location:     )    AMPAC score:    (  ) Indwelling Kam Catheter:   Date insterted:    Reason (  ) Critical illness     (  ) urinary retention    (  ) Accurate Ins/Outs Monitoring     (  ) CMO patient    (  ) Central Line:   Date inserted:  Location: (  ) Right IJ     (  ) Left IJ     (  ) Right Fem     (  ) Left Fem    (  ) SPC        (  ) pigtail       (  ) PEG tube       (  ) colostomy       (  ) jejunostomy  (  ) U-Dall    LABS:                        11.1   6.44  )-----------( 515      ( 22 Sep 2023 05:44 )             32.6         134<L>  |  97<L>  |  24<H>  ----------------------------<  92  3.7   |  22  |  1.3    Ca    9.9      22 Sep 2023 05:44  Mg     1.9         TPro  6.6  /  Alb  4.0  /  TBili  0.3  /  DBili  x   /  AST  29  /  ALT  10  /  AlkPhos  29<L>        Urinalysis Basic - ( 23 Sep 2023 00:01 )    Color: Yellow / Appearance: Clear / S.005 / pH: x  Gluc: x / Ketone: Negative mg/dL  / Bili: Negative / Urobili: 0.2 mg/dL   Blood: x / Protein: Negative mg/dL / Nitrite: Negative   Leuk Esterase: Negative / RBC: x / WBC x   Sq Epi: x / Non Sq Epi: x / Bacteria: x        Troponin T, Serum: <0.01 ng/mL (23 @ 20:51)      CARDIAC MARKERS ( 22 Sep 2023 20:51 )  x     / <0.01 ng/mL / x     / x     / x          RADIOLOGY:

## 2023-09-23 NOTE — CONSULT NOTE ADULT - ATTENDING COMMENTS
Patient seen and examined. Pertinent labs, imaging and telemetry reviewed. I agree with the above:     Patient feeling well. Denies complaints. He feels his LE edema is much improved.   -Unclear cause of LE edema. No signs or symptoms of HF. TTE without LVH or diastolic dysfunction.   -Transition lasix IV to lasix 40mg PO daily.   -Pt to follow up with Dr. Edwards as outpatient.     Discussed with patient, fellow. Patient seen and examined. Pertinent labs, imaging and telemetry reviewed. I agree with the above:     Patient feeling well. Denies complaints. He feels his LE edema is much improved.   -Unclear cause of LE edema. No signs or symptoms of HF. TTE without LVH or diastolic dysfunction.   -Transition lasix IV to lasix 40mg PO daily.   -Pt to follow up with Dr. Edwards as outpatient.   -EKG reviewed. Some minor TWI in inferior leads. Trops negative and no symptoms of CP or ACS.   -Can repeat as outpatient.     Discussed with patient, fellow.

## 2023-09-23 NOTE — CONSULT NOTE ADULT - ASSESSMENT
#Fluid overload s/p IV diuresis - improved    - TTE: EF 62%, no valvular disease    - Troponin nl x2, EKG with mild ST downsloping in lateral leads  #DM  #HTN  #CVA    Recommendations:  - Pt without chest pain, reports improvement of LE edema  - No clinical signs of acute ischemia  - Can be discharged on Lasix 40 mg PO once daily   - Follow up with Dr. Edwards as outpatient

## 2023-09-23 NOTE — CONSULT NOTE ADULT - SUBJECTIVE AND OBJECTIVE BOX
Outpt cardiologist: Dr. Edwards    HPI:  This is a 68-year-old male history of HLD, type 2 diabetes hypertension CVA who presents to the ED with bilateral lower extremity edema, acutely worsening over the last week. Patient recently presented to the ED for a similar complaint and was discharged, and instructed to follow up with Cardiology. He was started on diuretics outpatient. He also complaints of constipation with last BM 7 days ago, associated with abdominal pain. Patient denies chest pain, fever, SOB, or chills. Patient has now had a BM and endorses improvement in abdominal pain.     In the ED, patient is hemodynamically stable and afebrile. Labs were significant for Hgb 10.3, Na 125, BUN/Cr 32/1.6,  and trop negative x 1. CT abdomen and pelvis mild circumferential rectal wall thickening, and small bilateral pleural effusions and bibasilar atelectasis. Xray negative for acute pathologies. He was given furosemide IV 40 x1 and admitted to medicine for further management.  (21 Sep 2023 12:19)      Cardiology HPI:  Cardiology consulted for findings of EKG changes. Pt presented with LE edema, states that his PMD recently started lasix 20 mg PO but he began to experience worsening LE edema and presented to the ED. Pt receivied IV diuresis and now feels "back to normal". Denies experiencing any chest pain, dyspnea, lightheadedness. Feels ready to go home.  EKG during hospital stay shows downsloping of ST segment in V5-V6, as well as inferior leads.    PAST MEDICAL & SURGICAL HISTORY  DM (diabetes mellitus)    HTN (hypertension)    HLD (hyperlipidemia)    CVA (cerebrovascular accident)    Status post cataract extraction and insertion of intraocular lens of left eye      ALLERGIES:  No Known Allergies      MEDICATIONS:  aspirin enteric coated 81 milliGRAM(s) Oral daily  atorvastatin 20 milliGRAM(s) Oral at bedtime  dextrose 5%. 1000 milliLiter(s) (50 mL/Hr) IV Continuous <Continuous>  dextrose 5%. 1000 milliLiter(s) (100 mL/Hr) IV Continuous <Continuous>  dextrose 50% Injectable 25 Gram(s) IV Push once  dextrose 50% Injectable 25 Gram(s) IV Push once  dextrose 50% Injectable 12.5 Gram(s) IV Push once  enoxaparin Injectable 40 milliGRAM(s) SubCutaneous every 24 hours  fenofibrate Tablet 145 milliGRAM(s) Oral daily  furosemide   Injectable 40 milliGRAM(s) IV Push daily  glucagon  Injectable 1 milliGRAM(s) IntraMuscular once  insulin lispro (ADMELOG) corrective regimen sliding scale   SubCutaneous three times a day before meals  NIFEdipine XL 60 milliGRAM(s) Oral daily  pantoprazole    Tablet 40 milliGRAM(s) Oral before breakfast  polyethylene glycol 3350 17 Gram(s) Oral daily  senna 2 Tablet(s) Oral at bedtime    PRN:  dextrose Oral Gel 15 Gram(s) Oral once PRN      HOME MEDICATIONS:  Home Medications:  Aspir 81 oral delayed release tablet: 1 tab(s) orally once a day (2023 13:38)  atorvastatin 20 mg oral tablet: 1 tab(s) orally once a day (2023 13:38)  fenofibrate 145 mg oral tablet: 1 tab(s) orally once a day (2023 13:38)  furosemide 20 mg oral tablet: 1 tab(s) orally once a day (21 Sep 2023 13:01)  metFORMIN 1000 mg oral tablet: 1 tab(s) orally 2 times a day (2023 13:38)  NIFEdipine 60 mg oral tablet, extended release: 1 tab(s) orally once a day (2023 13:38)  telmisartan-hydrochlorothiazide 80 mg-25 mg oral tablet: 1 tab(s) orally once a day (2023 13:38)  Vitamin B12 50 mcg oral tablet: 1 tab(s) orally once a day (2023 13:38)      VITALS:   T(F): 97.9 ( @ 00:05), Max: 99 ( @ 15:45)  HR: 86 ( @ 00:05) (86 - 100)  BP: 126/59 ( @ 00:05) (126/59 - 164/70)  BP(mean): --  RR: 18 ( @ 00:05) (16 - 19)  SpO2: 97% ( @ 22:53) (97% - 98%)    I&O's Summary    22 Sep 2023 07:01  -  23 Sep 2023 01:52  --------------------------------------------------------  IN: 0 mL / OUT: 1150 mL / NET: -1150 mL        REVIEW OF SYSTEMS:  CONSTITUTIONAL: No weakness, fevers or chills  HEENT: No visual changes, neck/ear pain  RESPIRATORY: No cough, sob  CARDIOVASCULAR: See HPI  GASTROINTESTINAL: No abdominal pain. No nausea, vomiting, diarrhea   GENITOURINARY: No dysuria, frequency or hematuria  NEUROLOGICAL: No new focal deficits  SKIN: No new rashes    PHYSICAL EXAM:  General: Not in distress.  Non-toxic appearing.   Cardio: regular, S1, S2, no murmur  Pulm: Dec breath sounds in bilateral bases  Abdomen: Soft, non-tender, non-distended, BS+  Extremities: No edema in bilateral LE  Neuro: A&O x3. No focal deficits      LABS:                        11.1   6.44  )-----------( 515      ( 22 Sep 2023 05:44 )             32.6         134<L>  |  97<L>  |  24<H>  ----------------------------<  92  3.7   |  22  |  1.3    Ca    9.9      22 Sep 2023 05:44  Mg     1.9         TPro  6.6  /  Alb  4.0  /  TBili  0.3  /  DBili  x   /  AST  29  /  ALT  10  /  AlkPhos  29<L>        Troponin T, Serum: <0.01 ng/mL (- @ 20:51)    CARDIAC MARKERS ( 22 Sep 2023 20:51 )  x     / <0.01 ng/mL / x     / x     / x      CARDIAC MARKERS ( 21 Sep 2023 06:35 )  x     / <0.01 ng/mL / x     / x     / x            Troponin trend:        COVID-19 PCR: NotDetec (2023 13:02)      RADIOLOGY:    -TTE: EF 62%, no valvular disease  -STRESS TEST: 2022: No perfusion defects    EC Lead ECG:   Ventricular Rate 90 BPM    Atrial Rate 90 BPM    P-R Interval 160 ms    QRS Duration 92 ms    Q-T Interval 400 ms    QTC Calculation(Bazett) 489 ms    P Axis 21 degrees    R Axis 92 degrees    T Axis -37 degrees    Diagnosis Line Normal sinus rhythm  Rightward axis  Incomplete right bundle branch block  ST & T wave abnormality, consider inferior ischemia  Prolonged QT  Abnormal ECG    Confirmed by [email or web address] ([id number]) on 2023 11:53:27 AM ( @ 10:29)

## 2023-09-27 DIAGNOSIS — K59.00 CONSTIPATION, UNSPECIFIED: ICD-10-CM

## 2023-09-27 DIAGNOSIS — E87.6 HYPOKALEMIA: ICD-10-CM

## 2023-09-27 DIAGNOSIS — E11.22 TYPE 2 DIABETES MELLITUS WITH DIABETIC CHRONIC KIDNEY DISEASE: ICD-10-CM

## 2023-09-27 DIAGNOSIS — N18.30 CHRONIC KIDNEY DISEASE, STAGE 3 UNSPECIFIED: ICD-10-CM

## 2023-09-27 DIAGNOSIS — E78.5 HYPERLIPIDEMIA, UNSPECIFIED: ICD-10-CM

## 2023-09-27 DIAGNOSIS — I13.0 HYPERTENSIVE HEART AND CHRONIC KIDNEY DISEASE WITH HEART FAILURE AND STAGE 1 THROUGH STAGE 4 CHRONIC KIDNEY DISEASE, OR UNSPECIFIED CHRONIC KIDNEY DISEASE: ICD-10-CM

## 2023-09-27 DIAGNOSIS — R60.9 EDEMA, UNSPECIFIED: ICD-10-CM

## 2023-09-27 DIAGNOSIS — D50.9 IRON DEFICIENCY ANEMIA, UNSPECIFIED: ICD-10-CM

## 2023-09-27 DIAGNOSIS — I50.33 ACUTE ON CHRONIC DIASTOLIC (CONGESTIVE) HEART FAILURE: ICD-10-CM

## 2023-09-27 DIAGNOSIS — Z86.73 PERSONAL HISTORY OF TRANSIENT ISCHEMIC ATTACK (TIA), AND CEREBRAL INFARCTION WITHOUT RESIDUAL DEFICITS: ICD-10-CM

## 2023-09-27 DIAGNOSIS — I25.10 ATHEROSCLEROTIC HEART DISEASE OF NATIVE CORONARY ARTERY WITHOUT ANGINA PECTORIS: ICD-10-CM

## 2023-11-20 ENCOUNTER — NON-APPOINTMENT (OUTPATIENT)
Age: 68
End: 2023-11-20

## 2023-11-20 ENCOUNTER — APPOINTMENT (OUTPATIENT)
Dept: CARDIOLOGY | Facility: CLINIC | Age: 68
End: 2023-11-20
Payer: COMMERCIAL

## 2023-11-20 ENCOUNTER — RESULT CHARGE (OUTPATIENT)
Age: 68
End: 2023-11-20

## 2023-11-20 VITALS
BODY MASS INDEX: 33.11 KG/M2 | OXYGEN SATURATION: 97 % | SYSTOLIC BLOOD PRESSURE: 128 MMHG | DIASTOLIC BLOOD PRESSURE: 60 MMHG | WEIGHT: 206 LBS | HEIGHT: 66 IN | HEART RATE: 70 BPM

## 2023-11-20 PROCEDURE — 93000 ELECTROCARDIOGRAM COMPLETE: CPT

## 2023-11-20 PROCEDURE — 99214 OFFICE O/P EST MOD 30 MIN: CPT

## 2023-11-20 RX ORDER — FENOFIBRATE 145 MG/1
145 TABLET, COATED ORAL DAILY
Refills: 0 | Status: ACTIVE | COMMUNITY

## 2023-11-20 RX ORDER — ATORVASTATIN CALCIUM 20 MG/1
20 TABLET, FILM COATED ORAL DAILY
Refills: 0 | Status: ACTIVE | COMMUNITY

## 2023-11-20 RX ORDER — NIFEDIPINE 60 MG/1
60 TABLET, EXTENDED RELEASE ORAL DAILY
Refills: 0 | Status: ACTIVE | COMMUNITY

## 2023-11-20 RX ORDER — TELMISARTAN AND HYDROCHLOROTHIAZIDE 80; 25 MG/1; MG/1
80-25 TABLET ORAL DAILY
Refills: 0 | Status: ACTIVE | COMMUNITY

## 2023-11-20 RX ORDER — ASPIRIN 81 MG
81 TABLET, DELAYED RELEASE (ENTERIC COATED) ORAL
Refills: 0 | Status: ACTIVE | COMMUNITY

## 2023-11-20 RX ORDER — METFORMIN HYDROCHLORIDE 1000 MG/1
1000 TABLET, COATED ORAL DAILY
Refills: 0 | Status: ACTIVE | COMMUNITY

## 2023-12-05 ENCOUNTER — APPOINTMENT (OUTPATIENT)
Dept: CARDIOLOGY | Facility: CLINIC | Age: 68
End: 2023-12-05

## 2024-02-20 ENCOUNTER — APPOINTMENT (OUTPATIENT)
Dept: ORTHOPEDIC SURGERY | Facility: CLINIC | Age: 69
End: 2024-02-20

## 2024-02-26 ENCOUNTER — RESULT CHARGE (OUTPATIENT)
Age: 69
End: 2024-02-26

## 2024-02-26 ENCOUNTER — APPOINTMENT (OUTPATIENT)
Dept: CARDIOLOGY | Facility: CLINIC | Age: 69
End: 2024-02-26
Payer: COMMERCIAL

## 2024-02-26 VITALS
BODY MASS INDEX: 33.59 KG/M2 | WEIGHT: 209 LBS | SYSTOLIC BLOOD PRESSURE: 140 MMHG | HEART RATE: 81 BPM | DIASTOLIC BLOOD PRESSURE: 54 MMHG | HEIGHT: 66 IN

## 2024-02-26 DIAGNOSIS — E11.9 TYPE 2 DIABETES MELLITUS W/OUT COMPLICATIONS: ICD-10-CM

## 2024-02-26 DIAGNOSIS — I25.10 ATHEROSCLEROTIC HEART DISEASE OF NATIVE CORONARY ARTERY W/OUT ANGINA PECTORIS: ICD-10-CM

## 2024-02-26 DIAGNOSIS — I10 ESSENTIAL (PRIMARY) HYPERTENSION: ICD-10-CM

## 2024-02-26 DIAGNOSIS — E78.5 HYPERLIPIDEMIA, UNSPECIFIED: ICD-10-CM

## 2024-02-26 DIAGNOSIS — I65.23 OCCLUSION AND STENOSIS OF BILATERAL CAROTID ARTERIES: ICD-10-CM

## 2024-02-26 PROCEDURE — 93000 ELECTROCARDIOGRAM COMPLETE: CPT

## 2024-02-26 PROCEDURE — 99214 OFFICE O/P EST MOD 30 MIN: CPT | Mod: 25

## 2024-02-26 NOTE — ASSESSMENT
[FreeTextEntry1] : CAD by CT scan No surgical disease LAD lesion - negative nuclear MPI in anterior wall. No LAD ischemia. Mild lesion in LCX - small inferior wall defect. DM, DL, HTN C/w non-obstructive CAD Dizziness, bradycardia. He was offered ILR, but declined. Reviewed [facility]OT - no sustiend arrhythmia. Frequent PVC's. New onset CHF  C/w medical therapy C/wLipitor - rational discussed. C/w ASA, statin. DM, Lipid control. C/w Lasix PRN 2D echo noted Hold b-blocker C/w Nifedipine BID Changed MicardisHCT to telmisartan 80 + chlorthalidone - f/u with nephrology, Dr. Frazier Repeat stress test in November 2022 - negative.    Labs reviewed  F/u with vascular for the CTA of the neck results. Has appointment with Dr. Chrsitianson.  Patient was advised about healthy lifestyle changes, including diet and exercise. Importance of sustained long-term weight loss was discussed, questions answered.   F/u in 6 months.

## 2024-02-26 NOTE — PHYSICAL EXAM
[Well Groomed] : well groomed [Normal Appearance] : normal appearance [No Deformities] : no deformities [General Appearance - In No Acute Distress] : no acute distress [Normal Conjunctiva] : the conjunctiva exhibited no abnormalities [Eyelids - No Xanthelasma] : the eyelids demonstrated no xanthelasmas [] : no respiratory distress [Respiration, Rhythm And Depth] : normal respiratory rhythm and effort [Exaggerated Use Of Accessory Muscles For Inspiration] : no accessory muscle use [Auscultation Breath Sounds / Voice Sounds] : lungs were clear to auscultation bilaterally [Heart Sounds] : normal S1 and S2 [Heart Rate And Rhythm] : heart rate and rhythm were normal [Murmurs] : no murmurs present [Edema] : no peripheral edema present [Abdomen Soft] : soft [Bowel Sounds] : normal bowel sounds [Abdomen Tenderness] : non-tender [Abnormal Walk] : normal gait [Skin Color & Pigmentation] : normal skin color and pigmentation [Nail Clubbing] : no clubbing of the fingernails [Cyanosis, Localized] : no localized cyanosis [Oriented To Time, Place, And Person] : oriented to person, place, and time [Skin Turgor] : normal skin turgor [Affect] : the affect was normal [FreeTextEntry1] : no JVD

## 2024-02-26 NOTE — HISTORY OF PRESENT ILLNESS
[FreeTextEntry1] : 68 y/o male with history of HTN, DM, DL,  Post  [facility] ER  visit  on 11/2020 for  chest discomfort. CCTA was abnormal with LAD lesion, which was not well evaluated due to motion artifact. The patient also had a non-obstructive lesion in the LCX. RCA was small (non-dominant).  Had an episode of chest pain - last stress test in 2020. Was in the hospital with bradycardia - stopped labetalol.   Had dizziness.  Was diagnosed with carotid disease. F/u with vascular surgery, no surgical intervention was recommended. will f/u on yearly basis.  CTA 07/11/23 R ICA occlusion.  Denies any s/s of TIA or CVA.  Had ER visit due to LE edema, which was new, pro-BNP was elevated. CXR negative, LE Doppler - negative.  Pt was ordered lasix.  Resolved edema.  Pt is on lasix PRN only didnt taking for a long time.  Nephrologist increased Nifedipine ER 60mg BID  Pt denies chest pain, no dizziness, has SOB  with exertion no edema.  10/30/23 Chol 139 LDL 67 Trig 106  Holter/MCOT - negative for sustained arrhythmia Nuclear MPI - small inferior defect.

## 2024-05-15 ENCOUNTER — APPOINTMENT (OUTPATIENT)
Dept: VASCULAR SURGERY | Facility: CLINIC | Age: 69
End: 2024-05-15

## 2024-08-27 ENCOUNTER — APPOINTMENT (OUTPATIENT)
Dept: CARDIOLOGY | Facility: CLINIC | Age: 69
End: 2024-08-27
Payer: COMMERCIAL

## 2024-08-27 VITALS
SYSTOLIC BLOOD PRESSURE: 128 MMHG | BODY MASS INDEX: 33.43 KG/M2 | HEIGHT: 66 IN | TEMPERATURE: 98 F | WEIGHT: 208 LBS | DIASTOLIC BLOOD PRESSURE: 70 MMHG | HEART RATE: 64 BPM

## 2024-08-27 DIAGNOSIS — E11.9 TYPE 2 DIABETES MELLITUS W/OUT COMPLICATIONS: ICD-10-CM

## 2024-08-27 DIAGNOSIS — I10 ESSENTIAL (PRIMARY) HYPERTENSION: ICD-10-CM

## 2024-08-27 DIAGNOSIS — I25.10 ATHEROSCLEROTIC HEART DISEASE OF NATIVE CORONARY ARTERY W/OUT ANGINA PECTORIS: ICD-10-CM

## 2024-08-27 DIAGNOSIS — I65.23 OCCLUSION AND STENOSIS OF BILATERAL CAROTID ARTERIES: ICD-10-CM

## 2024-08-27 DIAGNOSIS — E78.5 HYPERLIPIDEMIA, UNSPECIFIED: ICD-10-CM

## 2024-08-27 PROCEDURE — 93000 ELECTROCARDIOGRAM COMPLETE: CPT

## 2024-08-27 PROCEDURE — G2211 COMPLEX E/M VISIT ADD ON: CPT | Mod: NC

## 2024-08-27 PROCEDURE — 99214 OFFICE O/P EST MOD 30 MIN: CPT | Mod: 25

## 2024-08-27 NOTE — HISTORY OF PRESENT ILLNESS
[FreeTextEntry1] : 70 y/o male with history of HTN, DM, DL,  Post  [facility] ER  visit  on 11/2020 for  chest discomfort. CCTA was abnormal with LAD lesion, which was not well evaluated due to motion artifact. The patient also had a non-obstructive lesion in the LCX. RCA was small (non-dominant).  Had an episode of chest pain - last stress test in 2020. Was in the hospital with bradycardia - stopped labetalol.   Had dizziness.  Was diagnosed with carotid disease. F/u with vascular surgery, no surgical intervention was recommended. will f/u on yearly basis.  CTA 07/11/23 R ICA occlusion.  Denies any s/s of TIA or CVA.  Had ER visit due to LE edema, which was new, pro-BNP was elevated. CXR negative, LE Doppler - negative.  Pt was ordered lasix.  Resolved edema.  Pt is on lasix PRN only didnt taking for a long time.  Nephrologist increased Nifedipine ER 60mg BID  Pt denies chest pain, no dizziness, has SOB  with exertion no edema.  10/30/23 Chol 139 LDL 67 Trig 106  Holter/MCOT - negative for sustained arrhythmia Nuclear MPI - small inferior defect.

## 2024-08-27 NOTE — PHYSICAL EXAM
[Normal Appearance] : normal appearance [Well Groomed] : well groomed [No Deformities] : no deformities [General Appearance - In No Acute Distress] : no acute distress [Normal Conjunctiva] : the conjunctiva exhibited no abnormalities [Eyelids - No Xanthelasma] : the eyelids demonstrated no xanthelasmas [] : no respiratory distress [Respiration, Rhythm And Depth] : normal respiratory rhythm and effort [Exaggerated Use Of Accessory Muscles For Inspiration] : no accessory muscle use [Auscultation Breath Sounds / Voice Sounds] : lungs were clear to auscultation bilaterally [Heart Rate And Rhythm] : heart rate and rhythm were normal [Heart Sounds] : normal S1 and S2 [Murmurs] : no murmurs present [Edema] : no peripheral edema present [Bowel Sounds] : normal bowel sounds [Abdomen Soft] : soft [Abdomen Tenderness] : non-tender [Abnormal Walk] : normal gait [Cyanosis, Localized] : no localized cyanosis [Nail Clubbing] : no clubbing of the fingernails [Skin Color & Pigmentation] : normal skin color and pigmentation [Skin Turgor] : normal skin turgor [Oriented To Time, Place, And Person] : oriented to person, place, and time [Affect] : the affect was normal [FreeTextEntry1] : no JVD

## 2024-08-27 NOTE — ASSESSMENT
[FreeTextEntry1] : CAD by CT scan No surgical disease LAD lesion - negative nuclear MPI in anterior wall. No LAD ischemia. Mild lesion in LCX - small inferior wall defect. DM, DL, HTN C/w non-obstructive CAD Dizziness, bradycardia. He was offered ILR, but declined. Reviewed [facility]OT - no sustiend arrhythmia. Frequent PVC's. New onset CHF  C/w medical therapy C/wLipitor - rational discussed. C/w ASA, statin. DM, Lipid control. C/w Lasix PRN 2D echo noted Hold b-blocker C/w Nifedipine BID Changed MicardisHCT to telmisartan 80 + chlorthalidone - f/u with nephrology, Dr. Frazier Repeat stress test in November 2022 - negative.    Labs reviewed  F/u with vascular for the CTA of the neck results.   Patient was advised about healthy lifestyle changes, including diet and exercise. Importance of sustained long-term weight loss was discussed, questions answered.   F/u in 6 months.

## 2024-10-08 ENCOUNTER — APPOINTMENT (OUTPATIENT)
Dept: VASCULAR SURGERY | Facility: CLINIC | Age: 69
End: 2024-10-08
Payer: COMMERCIAL

## 2024-10-08 VITALS
HEIGHT: 66 IN | BODY MASS INDEX: 33.43 KG/M2 | WEIGHT: 208 LBS | DIASTOLIC BLOOD PRESSURE: 63 MMHG | SYSTOLIC BLOOD PRESSURE: 110 MMHG

## 2024-10-08 DIAGNOSIS — I65.23 OCCLUSION AND STENOSIS OF BILATERAL CAROTID ARTERIES: ICD-10-CM

## 2024-10-08 PROCEDURE — 93880 EXTRACRANIAL BILAT STUDY: CPT

## 2024-10-08 PROCEDURE — 99212 OFFICE O/P EST SF 10 MIN: CPT

## 2025-02-25 ENCOUNTER — NON-APPOINTMENT (OUTPATIENT)
Age: 70
End: 2025-02-25

## 2025-02-25 ENCOUNTER — APPOINTMENT (OUTPATIENT)
Dept: CARDIOLOGY | Facility: CLINIC | Age: 70
End: 2025-02-25
Payer: COMMERCIAL

## 2025-02-25 VITALS
WEIGHT: 202 LBS | SYSTOLIC BLOOD PRESSURE: 122 MMHG | HEIGHT: 65 IN | HEART RATE: 72 BPM | DIASTOLIC BLOOD PRESSURE: 56 MMHG | BODY MASS INDEX: 33.66 KG/M2

## 2025-02-25 DIAGNOSIS — E78.5 HYPERLIPIDEMIA, UNSPECIFIED: ICD-10-CM

## 2025-02-25 DIAGNOSIS — I65.23 OCCLUSION AND STENOSIS OF BILATERAL CAROTID ARTERIES: ICD-10-CM

## 2025-02-25 DIAGNOSIS — E11.9 TYPE 2 DIABETES MELLITUS W/OUT COMPLICATIONS: ICD-10-CM

## 2025-02-25 DIAGNOSIS — I25.10 ATHEROSCLEROTIC HEART DISEASE OF NATIVE CORONARY ARTERY W/OUT ANGINA PECTORIS: ICD-10-CM

## 2025-02-25 DIAGNOSIS — I10 ESSENTIAL (PRIMARY) HYPERTENSION: ICD-10-CM

## 2025-02-25 PROCEDURE — 93000 ELECTROCARDIOGRAM COMPLETE: CPT

## 2025-02-25 PROCEDURE — 99214 OFFICE O/P EST MOD 30 MIN: CPT

## 2025-02-25 PROCEDURE — G2211 COMPLEX E/M VISIT ADD ON: CPT | Mod: NC

## 2025-02-25 RX ORDER — DAPAGLIFLOZIN 10 MG/1
10 TABLET, FILM COATED ORAL DAILY
Refills: 0 | Status: ACTIVE | COMMUNITY

## 2025-02-25 RX ORDER — QUINIDINE SULFATE 200 MG
TABLET ORAL DAILY
Refills: 0 | Status: ACTIVE | COMMUNITY

## 2025-04-08 ENCOUNTER — APPOINTMENT (OUTPATIENT)
Dept: VASCULAR SURGERY | Facility: CLINIC | Age: 70
End: 2025-04-08
Payer: COMMERCIAL

## 2025-04-08 VITALS — BODY MASS INDEX: 34.31 KG/M2 | WEIGHT: 201 LBS | HEIGHT: 64 IN

## 2025-04-08 DIAGNOSIS — I65.23 OCCLUSION AND STENOSIS OF BILATERAL CAROTID ARTERIES: ICD-10-CM

## 2025-04-08 PROCEDURE — 99212 OFFICE O/P EST SF 10 MIN: CPT

## 2025-04-08 PROCEDURE — 93880 EXTRACRANIAL BILAT STUDY: CPT

## 2025-08-26 ENCOUNTER — APPOINTMENT (OUTPATIENT)
Dept: CARDIOLOGY | Facility: CLINIC | Age: 70
End: 2025-08-26
Payer: COMMERCIAL

## 2025-08-26 VITALS
DIASTOLIC BLOOD PRESSURE: 65 MMHG | HEIGHT: 64 IN | HEART RATE: 63 BPM | BODY MASS INDEX: 36.54 KG/M2 | SYSTOLIC BLOOD PRESSURE: 115 MMHG | WEIGHT: 214 LBS

## 2025-08-26 DIAGNOSIS — I65.23 OCCLUSION AND STENOSIS OF BILATERAL CAROTID ARTERIES: ICD-10-CM

## 2025-08-26 DIAGNOSIS — E78.5 HYPERLIPIDEMIA, UNSPECIFIED: ICD-10-CM

## 2025-08-26 DIAGNOSIS — I25.10 ATHEROSCLEROTIC HEART DISEASE OF NATIVE CORONARY ARTERY W/OUT ANGINA PECTORIS: ICD-10-CM

## 2025-08-26 DIAGNOSIS — I10 ESSENTIAL (PRIMARY) HYPERTENSION: ICD-10-CM

## 2025-08-26 DIAGNOSIS — E11.9 TYPE 2 DIABETES MELLITUS W/OUT COMPLICATIONS: ICD-10-CM

## 2025-08-26 PROCEDURE — G2211 COMPLEX E/M VISIT ADD ON: CPT | Mod: NC

## 2025-08-26 PROCEDURE — 99214 OFFICE O/P EST MOD 30 MIN: CPT

## 2025-08-26 PROCEDURE — 93000 ELECTROCARDIOGRAM COMPLETE: CPT
